# Patient Record
Sex: MALE | Race: WHITE | NOT HISPANIC OR LATINO
[De-identification: names, ages, dates, MRNs, and addresses within clinical notes are randomized per-mention and may not be internally consistent; named-entity substitution may affect disease eponyms.]

---

## 2017-05-19 ENCOUNTER — APPOINTMENT (OUTPATIENT)
Dept: UROLOGY | Facility: CLINIC | Age: 77
End: 2017-05-19

## 2017-05-23 LAB — BACTERIA UR CULT: NORMAL

## 2017-06-02 ENCOUNTER — APPOINTMENT (OUTPATIENT)
Dept: UROLOGY | Facility: CLINIC | Age: 77
End: 2017-06-02

## 2017-06-02 ENCOUNTER — OUTPATIENT (OUTPATIENT)
Dept: OUTPATIENT SERVICES | Facility: HOSPITAL | Age: 77
LOS: 1 days | End: 2017-06-02
Payer: MEDICARE

## 2017-06-02 VITALS
RESPIRATION RATE: 16 BRPM | HEART RATE: 63 BPM | DIASTOLIC BLOOD PRESSURE: 82 MMHG | SYSTOLIC BLOOD PRESSURE: 179 MMHG | TEMPERATURE: 98 F

## 2017-06-02 PROCEDURE — 52000 CYSTOURETHROSCOPY: CPT

## 2017-06-06 DIAGNOSIS — R31.0 GROSS HEMATURIA: ICD-10-CM

## 2017-12-08 ENCOUNTER — APPOINTMENT (OUTPATIENT)
Dept: UROLOGY | Facility: CLINIC | Age: 77
End: 2017-12-08
Payer: MEDICARE

## 2017-12-08 PROCEDURE — 99213 OFFICE O/P EST LOW 20 MIN: CPT

## 2017-12-11 LAB — BACTERIA UR CULT: ABNORMAL

## 2018-06-08 ENCOUNTER — APPOINTMENT (OUTPATIENT)
Dept: UROLOGY | Facility: CLINIC | Age: 78
End: 2018-06-08
Payer: MEDICARE

## 2018-06-08 PROCEDURE — 99212 OFFICE O/P EST SF 10 MIN: CPT

## 2018-06-11 LAB — BACTERIA UR CULT: ABNORMAL

## 2018-06-11 RX ORDER — CIPROFLOXACIN HYDROCHLORIDE 500 MG/1
500 TABLET, FILM COATED ORAL TWICE DAILY
Qty: 14 | Refills: 0 | Status: ACTIVE | COMMUNITY
Start: 2018-06-11 | End: 1900-01-01

## 2019-06-07 ENCOUNTER — APPOINTMENT (OUTPATIENT)
Dept: UROLOGY | Facility: CLINIC | Age: 79
End: 2019-06-07
Payer: MEDICARE

## 2019-06-07 PROCEDURE — 99212 OFFICE O/P EST SF 10 MIN: CPT

## 2019-06-07 NOTE — HISTORY OF PRESENT ILLNESS
[FreeTextEntry1] : Patient  following up for retention.  He has a h/o LUTs and underwent a office-based thermotherapy ~ 4 years ago - he had a Marshall in/out a few times and was then converted to CIC which he has been doing since, 3-4 times a day and once a night. he does have urges which singles the need for a catheter. He had the Marshall replaced in [place] when hospitalized.  A Urodynamics noted high capacity fairly atonic bladder. He does CIC every 4 hours and 0-1 at night. He has noted occasional blood after CIC - NO UTIs this summer..\par Nothing new - about to go to [facility] for the Summer. \par

## 2019-06-10 LAB — BACTERIA UR CULT: NORMAL

## 2019-09-17 ENCOUNTER — APPOINTMENT (OUTPATIENT)
Dept: ORTHOPEDIC SURGERY | Facility: CLINIC | Age: 79
End: 2019-09-17
Payer: MEDICARE

## 2019-09-17 DIAGNOSIS — M17.0 BILATERAL PRIMARY OSTEOARTHRITIS OF KNEE: ICD-10-CM

## 2019-09-17 PROCEDURE — 20611 DRAIN/INJ JOINT/BURSA W/US: CPT | Mod: 58,RT

## 2019-09-17 PROCEDURE — 73562 X-RAY EXAM OF KNEE 3: CPT | Mod: 50

## 2019-09-17 PROCEDURE — 99204 OFFICE O/P NEW MOD 45 MIN: CPT | Mod: 25

## 2019-09-17 RX ORDER — FUROSEMIDE 20 MG/1
20 TABLET ORAL
Qty: 12 | Refills: 0 | Status: ACTIVE | COMMUNITY
Start: 2019-06-03

## 2019-09-17 RX ORDER — AMLODIPINE BESYLATE 5 MG/1
5 TABLET ORAL
Qty: 90 | Refills: 0 | Status: ACTIVE | COMMUNITY
Start: 2019-06-03

## 2019-09-17 RX ORDER — DORZOLAMIDE HYDROCHLORIDE TIMOLOL MALEATE 20; 5 MG/ML; MG/ML
22.3-6.8 SOLUTION/ DROPS OPHTHALMIC
Qty: 10 | Refills: 0 | Status: ACTIVE | COMMUNITY
Start: 2019-01-02

## 2019-09-17 RX ORDER — ACETAMINOPHEN AND CODEINE PHOSPHATE 300; 30 MG/1; MG/1
300-30 TABLET ORAL
Qty: 40 | Refills: 0 | Status: ACTIVE | COMMUNITY
Start: 2019-09-17 | End: 1900-01-01

## 2019-09-17 NOTE — HISTORY OF PRESENT ILLNESS
[de-identified] : C/O AMA KNEE PAIN - LAST HAD GEL INJECTIONS DONE MAY 2018 IN BOTH KNEES \par \par SENT FOR NEW XRAYS TODAYPatient states that he had a fairly miserable summer this year because of increasing bilateral pain right greater than left. Pain is worse with stair climbing.

## 2019-09-17 NOTE — DISCUSSION/SUMMARY
[Surgical risks reviewed] : Surgical risks reviewed [de-identified] : Regional risks and benefits of knee replacement surgery discussed in detail patient asked appropriate questions as did his wife. Their questions were answered she was in her satisfaction. We'll proceed with knee replacement surgery in 30 days time pending medical clearance.

## 2019-09-17 NOTE — PROCEDURE
[de-identified] : Patient was given a cortisone injection into the lateral compartment the right knee. This is done under sterile conditions an ultrasound guidance patient tolerated the procedure well.

## 2019-09-17 NOTE — PHYSICAL EXAM
[de-identified] : Right knee exam today patient has definite medial joint line tenderness range of motion 0-110°. Knee is stable to AP stress as well as varus and valgus stress. No effusion noted there is crepitus with range of motion. [de-identified] : AP standing individual laterals as well as sunrise views were obtained showing complete obliteration of the medial joint space on standing AP projection of both knees.

## 2019-09-24 ENCOUNTER — APPOINTMENT (OUTPATIENT)
Dept: ORTHOPEDIC SURGERY | Facility: CLINIC | Age: 79
End: 2019-09-24

## 2019-10-17 RX ORDER — INFLUENZA VIRUS VACCINE 15; 15; 15; 15 UG/.5ML; UG/.5ML; UG/.5ML; UG/.5ML
0.5 SUSPENSION INTRAMUSCULAR ONCE
Refills: 0 | Status: DISCONTINUED | OUTPATIENT
Start: 2019-10-18 | End: 2019-10-20

## 2019-10-17 RX ORDER — CHLORHEXIDINE GLUCONATE 213 G/1000ML
1 SOLUTION TOPICAL ONCE
Refills: 0 | Status: DISCONTINUED | OUTPATIENT
Start: 2019-10-18 | End: 2019-10-20

## 2019-10-17 RX ORDER — POVIDONE-IODINE 5 %
1 AEROSOL (ML) TOPICAL ONCE
Refills: 0 | Status: DISCONTINUED | OUTPATIENT
Start: 2019-10-18 | End: 2019-10-20

## 2019-10-18 ENCOUNTER — INPATIENT (INPATIENT)
Facility: HOSPITAL | Age: 79
LOS: 1 days | Discharge: HOME HEALTH | DRG: 470 | End: 2019-10-20
Attending: SPECIALIST | Admitting: SPECIALIST
Payer: MEDICARE

## 2019-10-18 ENCOUNTER — APPOINTMENT (OUTPATIENT)
Dept: ORTHOPEDIC SURGERY | Facility: HOSPITAL | Age: 79
End: 2019-10-18
Payer: MEDICARE

## 2019-10-18 VITALS
TEMPERATURE: 98 F | OXYGEN SATURATION: 98 % | DIASTOLIC BLOOD PRESSURE: 82 MMHG | SYSTOLIC BLOOD PRESSURE: 143 MMHG | RESPIRATION RATE: 16 BRPM | HEART RATE: 63 BPM

## 2019-10-18 DIAGNOSIS — E78.5 HYPERLIPIDEMIA, UNSPECIFIED: ICD-10-CM

## 2019-10-18 DIAGNOSIS — Z98.890 OTHER SPECIFIED POSTPROCEDURAL STATES: Chronic | ICD-10-CM

## 2019-10-18 DIAGNOSIS — N31.9 NEUROMUSCULAR DYSFUNCTION OF BLADDER, UNSPECIFIED: ICD-10-CM

## 2019-10-18 DIAGNOSIS — M17.11 UNILATERAL PRIMARY OSTEOARTHRITIS, RIGHT KNEE: ICD-10-CM

## 2019-10-18 DIAGNOSIS — I10 ESSENTIAL (PRIMARY) HYPERTENSION: ICD-10-CM

## 2019-10-18 LAB
APPEARANCE UR: ABNORMAL
BACTERIA # UR AUTO: PRESENT /HPF
BILIRUB UR-MCNC: NEGATIVE — SIGNIFICANT CHANGE UP
COLOR SPEC: YELLOW — SIGNIFICANT CHANGE UP
DIFF PNL FLD: ABNORMAL
EPI CELLS # UR: SIGNIFICANT CHANGE UP /HPF (ref 0–5)
GLUCOSE UR QL: NEGATIVE — SIGNIFICANT CHANGE UP
KETONES UR-MCNC: NEGATIVE — SIGNIFICANT CHANGE UP
LEUKOCYTE ESTERASE UR-ACNC: ABNORMAL
MRSA PCR RESULT.: NEGATIVE — SIGNIFICANT CHANGE UP
NITRITE UR-MCNC: NEGATIVE — SIGNIFICANT CHANGE UP
PH UR: 6.5 — SIGNIFICANT CHANGE UP (ref 5–8)
PROT UR-MCNC: 100 MG/DL
RBC CASTS # UR COMP ASSIST: ABNORMAL /HPF
S AUREUS DNA NOSE QL NAA+PROBE: NEGATIVE — SIGNIFICANT CHANGE UP
SP GR SPEC: 1.02 — SIGNIFICANT CHANGE UP (ref 1–1.03)
UROBILINOGEN FLD QL: 0.2 E.U./DL — SIGNIFICANT CHANGE UP
WBC UR QL: SIGNIFICANT CHANGE UP /HPF

## 2019-10-18 PROCEDURE — 27447 TOTAL KNEE ARTHROPLASTY: CPT | Mod: RT

## 2019-10-18 PROCEDURE — 73560 X-RAY EXAM OF KNEE 1 OR 2: CPT | Mod: 26,RT

## 2019-10-18 RX ORDER — SODIUM CHLORIDE 9 MG/ML
1000 INJECTION, SOLUTION INTRAVENOUS
Refills: 0 | Status: DISCONTINUED | OUTPATIENT
Start: 2019-10-18 | End: 2019-10-20

## 2019-10-18 RX ORDER — ASPIRIN/CALCIUM CARB/MAGNESIUM 324 MG
325 TABLET ORAL
Refills: 0 | Status: DISCONTINUED | OUTPATIENT
Start: 2019-10-18 | End: 2019-10-20

## 2019-10-18 RX ORDER — ONDANSETRON 8 MG/1
4 TABLET, FILM COATED ORAL EVERY 6 HOURS
Refills: 0 | Status: DISCONTINUED | OUTPATIENT
Start: 2019-10-18 | End: 2019-10-20

## 2019-10-18 RX ORDER — ACETAMINOPHEN 500 MG
650 TABLET ORAL EVERY 6 HOURS
Refills: 0 | Status: DISCONTINUED | OUTPATIENT
Start: 2019-10-18 | End: 2019-10-20

## 2019-10-18 RX ORDER — POLYETHYLENE GLYCOL 3350 17 G/17G
17 POWDER, FOR SOLUTION ORAL DAILY
Refills: 0 | Status: DISCONTINUED | OUTPATIENT
Start: 2019-10-18 | End: 2019-10-20

## 2019-10-18 RX ORDER — CEFAZOLIN SODIUM 1 G
2000 VIAL (EA) INJECTION EVERY 8 HOURS
Refills: 0 | Status: COMPLETED | OUTPATIENT
Start: 2019-10-18 | End: 2019-10-18

## 2019-10-18 RX ORDER — MAGNESIUM HYDROXIDE 400 MG/1
30 TABLET, CHEWABLE ORAL DAILY
Refills: 0 | Status: DISCONTINUED | OUTPATIENT
Start: 2019-10-18 | End: 2019-10-20

## 2019-10-18 RX ORDER — ACETAMINOPHEN 500 MG
1000 TABLET ORAL ONCE
Refills: 0 | Status: COMPLETED | OUTPATIENT
Start: 2019-10-18 | End: 2019-10-18

## 2019-10-18 RX ORDER — ATORVASTATIN CALCIUM 80 MG/1
20 TABLET, FILM COATED ORAL AT BEDTIME
Refills: 0 | Status: DISCONTINUED | OUTPATIENT
Start: 2019-10-18 | End: 2019-10-20

## 2019-10-18 RX ORDER — PANTOPRAZOLE SODIUM 20 MG/1
40 TABLET, DELAYED RELEASE ORAL
Refills: 0 | Status: DISCONTINUED | OUTPATIENT
Start: 2019-10-18 | End: 2019-10-20

## 2019-10-18 RX ORDER — OXYCODONE HYDROCHLORIDE 5 MG/1
5 TABLET ORAL EVERY 4 HOURS
Refills: 0 | Status: DISCONTINUED | OUTPATIENT
Start: 2019-10-18 | End: 2019-10-20

## 2019-10-18 RX ORDER — OXYCODONE HYDROCHLORIDE 5 MG/1
10 TABLET ORAL EVERY 4 HOURS
Refills: 0 | Status: DISCONTINUED | OUTPATIENT
Start: 2019-10-18 | End: 2019-10-20

## 2019-10-18 RX ORDER — MORPHINE SULFATE 50 MG/1
2 CAPSULE, EXTENDED RELEASE ORAL EVERY 4 HOURS
Refills: 0 | Status: DISCONTINUED | OUTPATIENT
Start: 2019-10-18 | End: 2019-10-20

## 2019-10-18 RX ORDER — KETOROLAC TROMETHAMINE 30 MG/ML
15 SYRINGE (ML) INJECTION EVERY 6 HOURS
Refills: 0 | Status: DISCONTINUED | OUTPATIENT
Start: 2019-10-18 | End: 2019-10-19

## 2019-10-18 RX ORDER — SENNA PLUS 8.6 MG/1
2 TABLET ORAL AT BEDTIME
Refills: 0 | Status: DISCONTINUED | OUTPATIENT
Start: 2019-10-18 | End: 2019-10-20

## 2019-10-18 RX ORDER — DOCUSATE SODIUM 100 MG
100 CAPSULE ORAL THREE TIMES A DAY
Refills: 0 | Status: DISCONTINUED | OUTPATIENT
Start: 2019-10-18 | End: 2019-10-20

## 2019-10-18 RX ORDER — APREPITANT 80 MG/1
40 CAPSULE ORAL ONCE
Refills: 0 | Status: COMPLETED | OUTPATIENT
Start: 2019-10-18 | End: 2019-10-18

## 2019-10-18 RX ORDER — CEFTRIAXONE 500 MG/1
1000 INJECTION, POWDER, FOR SOLUTION INTRAMUSCULAR; INTRAVENOUS EVERY 24 HOURS
Refills: 0 | Status: DISCONTINUED | OUTPATIENT
Start: 2019-10-18 | End: 2019-10-18

## 2019-10-18 RX ADMIN — Medication 15 MILLIGRAM(S): at 23:17

## 2019-10-18 RX ADMIN — OXYCODONE HYDROCHLORIDE 5 MILLIGRAM(S): 5 TABLET ORAL at 16:55

## 2019-10-18 RX ADMIN — Medication 100 MILLIGRAM(S): at 16:08

## 2019-10-18 RX ADMIN — OXYCODONE HYDROCHLORIDE 5 MILLIGRAM(S): 5 TABLET ORAL at 16:15

## 2019-10-18 RX ADMIN — Medication 650 MILLIGRAM(S): at 17:28

## 2019-10-18 RX ADMIN — APREPITANT 40 MILLIGRAM(S): 80 CAPSULE ORAL at 07:24

## 2019-10-18 RX ADMIN — Medication 15 MILLIGRAM(S): at 17:48

## 2019-10-18 RX ADMIN — Medication 650 MILLIGRAM(S): at 18:03

## 2019-10-18 RX ADMIN — Medication 650 MILLIGRAM(S): at 23:17

## 2019-10-18 RX ADMIN — ATORVASTATIN CALCIUM 20 MILLIGRAM(S): 80 TABLET, FILM COATED ORAL at 21:09

## 2019-10-18 RX ADMIN — Medication 100 MILLIGRAM(S): at 21:09

## 2019-10-18 RX ADMIN — Medication 100 MILLIGRAM(S): at 23:18

## 2019-10-18 RX ADMIN — Medication 325 MILLIGRAM(S): at 17:28

## 2019-10-18 RX ADMIN — Medication 1000 MILLIGRAM(S): at 07:26

## 2019-10-18 RX ADMIN — Medication 15 MILLIGRAM(S): at 17:28

## 2019-10-18 RX ADMIN — Medication 1000 MILLIGRAM(S): at 07:24

## 2019-10-18 NOTE — BRIEF OPERATIVE NOTE - NSICDXBRIEFPOSTOP_GEN_ALL_CORE_FT
POST-OP DIAGNOSIS:  Primary osteoarthritis of right knee 18-Oct-2019 10:50:32 Primary osteoarthritis of right knee Jann Finney

## 2019-10-18 NOTE — H&P ADULT - NSICDXPASTMEDICALHX_GEN_ALL_CORE_FT
PAST MEDICAL HISTORY:  Epididymitis     HLD (hyperlipidemia)     HTN (hypertension)     Neurogenic bladder pt self cathaterize every 6 hours

## 2019-10-18 NOTE — H&P ADULT - NSHPLABSRESULTS_GEN_ALL_CORE
Preop CBC, PT/INR, PTT within normal range  BMP reveals Cr. !.37   UA + leukesterase- patient self catheterizes- repeat UA DOS   3M DOS   Preop CXR within normal limits, reviewed per medical clearance   Preop EKG NSR and reviewed per medical clearance

## 2019-10-18 NOTE — PHYSICAL THERAPY INITIAL EVALUATION ADULT - ADDITIONAL COMMENTS
Patient lives in house, 1 flight to enter. Patient denies home health assistance, DME, or history of falls.

## 2019-10-18 NOTE — H&P ADULT - PROBLEM SELECTOR PLAN 1
Admit to Orthopedic Service   For elective Right Total Knee Replacement   Medically cleared and optimized for surgery by Dr. Bustos

## 2019-10-18 NOTE — H&P ADULT - HISTORY OF PRESENT ILLNESS
Patient is a 78 y/o male who presents with c/o right knee pain present for several years. Patient notes pain and stiffness at the right knee that started gradually and has progressed overtime. Patient reports failure of conservative management including intraarticular steroid injections, activity modification, pain control with oral analgesics. He denies associated numbness, tingling through the right lower extremity, denies use of a cane or walker for ambulation. Following thorough review of the risks and benefits of the procedure, patient elects to undergo right total knee replacement with Dr. Ndiaye today 10-18-19.   He denies recent illness, denies fever, chills, use of antibiotics, denies known h/o blood clots or use of anticoagulants  He endorses straight catheterization for neurogenic bladder.

## 2019-10-18 NOTE — BRIEF OPERATIVE NOTE - NSICDXBRIEFPREOP_GEN_ALL_CORE_FT
PRE-OP DIAGNOSIS:  Primary osteoarthritis of right knee 18-Oct-2019 10:50:26 Primary osteoarthritis of right knee Jann Finney

## 2019-10-18 NOTE — PHYSICAL THERAPY INITIAL EVALUATION ADULT - ACTIVE RANGE OF MOTION EXAMINATION, REHAB EVAL
deficits as listed below/bilateral upper extremity Active ROM was WFL (within functional limits)/except R knee flexion/extension/bilateral  lower extremity Active ROM was WFL (within functional limits)

## 2019-10-18 NOTE — PHYSICAL THERAPY INITIAL EVALUATION ADULT - PERTINENT HX OF CURRENT PROBLEM, REHAB EVAL
Patient is a 80 y/o male who presents with c/o right knee pain present for several years. Patient notes pain and stiffness at the right knee that started gradually and has progressed overtime. Patient reports failure of conservative management including intraarticular steroid injections, activity modification, pain control with oral analgesics.

## 2019-10-18 NOTE — PROGRESS NOTE ADULT - SUBJECTIVE AND OBJECTIVE BOX
Ortho Post Op Check    Procedure:  R TKA	  Surgeon:  Dr. Ndiaye    Pt comfortable without complaints, pain controlled.  Denies CP, SOB, N/V, numbness/tingling down le b/l.    Vital Signs Last 24 Hrs  T(C): 36.4 (10-18-19 @ 12:39), Max: 36.4 (10-18-19 @ 12:39)  T(F): 97.5 (10-18-19 @ 12:39), Max: 97.5 (10-18-19 @ 12:39)  HR: 70 (10-18-19 @ 12:39) (52 - 79)  BP: 127/79 (10-18-19 @ 12:39) (103/71 - 127/79)  BP(mean): 97 (10-18-19 @ 11:50) (95 - 97)  RR: 16 (10-18-19 @ 12:39) (15 - 27)  SpO2: 99% (10-18-19 @ 12:39) (97% - 99%)      General: Pt Alert and oriented, NAD  DSG ace wrap R knee C/D/I Cryocuff in place  Pulses:  DP's palpable b/l, brisk cap refill b/l  Sensation:  SILT throughout le b/l and symmetrically   Motor: EHL/FHL/TA/GS 5/5 b/l            Post-op X-Ray:  components in place, well positioned.    A/P: 79yMale POD#0 s/p R TKA  - Stable  - Pain Control as needed  - DVT ppx:  asa  - Post op abx:  ancef  - PT, WBS:  WBAT R knee  - Continue ceftriaxone for + UA  - Trend labs  - Dispo: pending PT eval    Ortho Pager [phone] Ortho Post Op Check    Procedure:  R TKA	  Surgeon:  Dr. Ndiaye    Pt comfortable without complaints, pain controlled.  Denies CP, SOB, N/V, numbness/tingling down le b/l.    Vital Signs Last 24 Hrs  T(C): 36.4 (10-18-19 @ 12:39), Max: 36.4 (10-18-19 @ 12:39)  T(F): 97.5 (10-18-19 @ 12:39), Max: 97.5 (10-18-19 @ 12:39)  HR: 70 (10-18-19 @ 12:39) (52 - 79)  BP: 127/79 (10-18-19 @ 12:39) (103/71 - 127/79)  BP(mean): 97 (10-18-19 @ 11:50) (95 - 97)  RR: 16 (10-18-19 @ 12:39) (15 - 27)  SpO2: 99% (10-18-19 @ 12:39) (97% - 99%)      General: Pt Alert and oriented, NAD  DSG ace wrap R knee C/D/I Cryocuff in place  Pulses:  DP's palpable b/l, brisk cap refill b/l  Sensation:  SILT throughout le b/l and symmetrically   Motor: EHL/FHL/TA/GS 5/5 b/l            Post-op X-Ray:  components in place, well positioned.    A/P: 79yMale POD#0 s/p R TKA  - Stable  - Pain Control as needed  - DVT ppx:  asa  - Post op abx:  ancef  - PT, WBS:  WBAT R knee  - As per pharmacy may continue 24hrs of ancef antibiotics for postop antibiotic therapy as well as UA  - Trend labs  - Dispo: pending PT eval    Ortho Pager [phone]

## 2019-10-18 NOTE — H&P ADULT - PROBLEM SELECTOR PLAN 2
patient self catheterizes at home  straight cath vs [place] during IP stay  follow repeat UA results- will treat as needed

## 2019-10-18 NOTE — H&P ADULT - NSHPPHYSICALEXAM_GEN_ALL_CORE
Gen:  80 y/o male, well nourished, well developed, NAD  MSK: Decreased ROM secondary to pain at the right knee   calves soft, nontender bilaterally   sensation intact to light touch bilateral lower extremities  DP 2+,  brisk capillary refill  EHL/FHL/TA/GS 5/5 bilaterally     Rest of PE per MD clearance

## 2019-10-19 LAB
ANION GAP SERPL CALC-SCNC: 12 MMOL/L — SIGNIFICANT CHANGE UP (ref 5–17)
BUN SERPL-MCNC: 30 MG/DL — HIGH (ref 7–23)
CALCIUM SERPL-MCNC: 8.2 MG/DL — LOW (ref 8.4–10.5)
CHLORIDE SERPL-SCNC: 105 MMOL/L — SIGNIFICANT CHANGE UP (ref 96–108)
CO2 SERPL-SCNC: 24 MMOL/L — SIGNIFICANT CHANGE UP (ref 22–31)
CREAT SERPL-MCNC: 1.45 MG/DL — HIGH (ref 0.5–1.3)
GLUCOSE SERPL-MCNC: 137 MG/DL — HIGH (ref 70–99)
HCT VFR BLD CALC: 44.7 % — SIGNIFICANT CHANGE UP (ref 39–50)
HGB BLD-MCNC: 14.6 G/DL — SIGNIFICANT CHANGE UP (ref 13–17)
MCHC RBC-ENTMCNC: 29.7 PG — SIGNIFICANT CHANGE UP (ref 27–34)
MCHC RBC-ENTMCNC: 32.7 GM/DL — SIGNIFICANT CHANGE UP (ref 32–36)
MCV RBC AUTO: 90.9 FL — SIGNIFICANT CHANGE UP (ref 80–100)
NRBC # BLD: 0 /100 WBCS — SIGNIFICANT CHANGE UP (ref 0–0)
PLATELET # BLD AUTO: 220 K/UL — SIGNIFICANT CHANGE UP (ref 150–400)
POTASSIUM SERPL-MCNC: 4 MMOL/L — SIGNIFICANT CHANGE UP (ref 3.5–5.3)
POTASSIUM SERPL-SCNC: 4 MMOL/L — SIGNIFICANT CHANGE UP (ref 3.5–5.3)
RBC # BLD: 4.92 M/UL — SIGNIFICANT CHANGE UP (ref 4.2–5.8)
RBC # FLD: 12.3 % — SIGNIFICANT CHANGE UP (ref 10.3–14.5)
SODIUM SERPL-SCNC: 141 MMOL/L — SIGNIFICANT CHANGE UP (ref 135–145)
WBC # BLD: 18.78 K/UL — HIGH (ref 3.8–10.5)
WBC # FLD AUTO: 18.78 K/UL — HIGH (ref 3.8–10.5)

## 2019-10-19 RX ADMIN — Medication 15 MILLIGRAM(S): at 12:48

## 2019-10-19 RX ADMIN — Medication 15 MILLIGRAM(S): at 05:37

## 2019-10-19 RX ADMIN — PANTOPRAZOLE SODIUM 40 MILLIGRAM(S): 20 TABLET, DELAYED RELEASE ORAL at 05:37

## 2019-10-19 RX ADMIN — Medication 15 MILLIGRAM(S): at 13:10

## 2019-10-19 RX ADMIN — ATORVASTATIN CALCIUM 20 MILLIGRAM(S): 80 TABLET, FILM COATED ORAL at 22:14

## 2019-10-19 RX ADMIN — Medication 650 MILLIGRAM(S): at 05:37

## 2019-10-19 RX ADMIN — Medication 15 MILLIGRAM(S): at 00:17

## 2019-10-19 RX ADMIN — Medication 325 MILLIGRAM(S): at 17:12

## 2019-10-19 RX ADMIN — Medication 650 MILLIGRAM(S): at 13:52

## 2019-10-19 RX ADMIN — Medication 15 MILLIGRAM(S): at 18:15

## 2019-10-19 RX ADMIN — Medication 100 MILLIGRAM(S): at 22:14

## 2019-10-19 RX ADMIN — Medication 100 MILLIGRAM(S): at 12:49

## 2019-10-19 RX ADMIN — Medication 650 MILLIGRAM(S): at 06:37

## 2019-10-19 RX ADMIN — POLYETHYLENE GLYCOL 3350 17 GRAM(S): 17 POWDER, FOR SOLUTION ORAL at 12:48

## 2019-10-19 RX ADMIN — Medication 650 MILLIGRAM(S): at 12:48

## 2019-10-19 RX ADMIN — Medication 325 MILLIGRAM(S): at 05:38

## 2019-10-19 RX ADMIN — Medication 15 MILLIGRAM(S): at 18:00

## 2019-10-19 RX ADMIN — Medication 650 MILLIGRAM(S): at 18:37

## 2019-10-19 RX ADMIN — Medication 100 MILLIGRAM(S): at 05:37

## 2019-10-19 RX ADMIN — Medication 650 MILLIGRAM(S): at 18:00

## 2019-10-19 RX ADMIN — Medication 15 MILLIGRAM(S): at 06:37

## 2019-10-19 RX ADMIN — Medication 650 MILLIGRAM(S): at 00:17

## 2019-10-19 NOTE — PROGRESS NOTE ADULT - SUBJECTIVE AND OBJECTIVE BOX
S: Patient seen and examined. Pt. doing well, Pain endorsed but controlled. No acute events overnight.    Vital Signs Last 24 Hrs  T(C): 36.4 (19 Oct 2019 04:57), Max: 37 (18 Oct 2019 16:29)  T(F): 97.5 (19 Oct 2019 04:57), Max: 98.6 (18 Oct 2019 16:29)  HR: 60 (19 Oct 2019 04:57) (52 - 81)  BP: 135/49 (19 Oct 2019 04:57) (103/71 - 150/92)  BP(mean): 97 (18 Oct 2019 11:50) (95 - 97)  RR: 13 (19 Oct 2019 04:57) (13 - 27)  SpO2: 96% (19 Oct 2019 04:57) (95% - 99%)    NAD, AOx3, comfortable  Motor: 5/5 GS/TA/EHL/FHL    Sensation: SILT   Pulses: WWP, DP/PT 2+    Dressing: C/D/I                          14.6   18.78 )-----------( 220      ( 19 Oct 2019 06:23 )             44.7         A/P:  79y Male s/p R TKA 10/18          -Stable  -Pain Control  -PT/WBAT  -DVT ppx: ASA  -Advance diet as tolerated  -f/u AM labs  -Dispo: H v R

## 2019-10-20 ENCOUNTER — TRANSCRIPTION ENCOUNTER (OUTPATIENT)
Age: 79
End: 2019-10-20

## 2019-10-20 VITALS
SYSTOLIC BLOOD PRESSURE: 140 MMHG | OXYGEN SATURATION: 95 % | DIASTOLIC BLOOD PRESSURE: 86 MMHG | TEMPERATURE: 98 F | RESPIRATION RATE: 17 BRPM | HEART RATE: 74 BPM

## 2019-10-20 LAB
ANION GAP SERPL CALC-SCNC: 10 MMOL/L — SIGNIFICANT CHANGE UP (ref 5–17)
BUN SERPL-MCNC: 30 MG/DL — HIGH (ref 7–23)
CALCIUM SERPL-MCNC: 8.4 MG/DL — SIGNIFICANT CHANGE UP (ref 8.4–10.5)
CHLORIDE SERPL-SCNC: 106 MMOL/L — SIGNIFICANT CHANGE UP (ref 96–108)
CO2 SERPL-SCNC: 26 MMOL/L — SIGNIFICANT CHANGE UP (ref 22–31)
CREAT SERPL-MCNC: 1.31 MG/DL — HIGH (ref 0.5–1.3)
GLUCOSE SERPL-MCNC: 105 MG/DL — HIGH (ref 70–99)
HCT VFR BLD CALC: 42.8 % — SIGNIFICANT CHANGE UP (ref 39–50)
HGB BLD-MCNC: 14.2 G/DL — SIGNIFICANT CHANGE UP (ref 13–17)
MCHC RBC-ENTMCNC: 30 PG — SIGNIFICANT CHANGE UP (ref 27–34)
MCHC RBC-ENTMCNC: 33.2 GM/DL — SIGNIFICANT CHANGE UP (ref 32–36)
MCV RBC AUTO: 90.3 FL — SIGNIFICANT CHANGE UP (ref 80–100)
NRBC # BLD: 0 /100 WBCS — SIGNIFICANT CHANGE UP (ref 0–0)
PLATELET # BLD AUTO: 180 K/UL — SIGNIFICANT CHANGE UP (ref 150–400)
POTASSIUM SERPL-MCNC: 4.2 MMOL/L — SIGNIFICANT CHANGE UP (ref 3.5–5.3)
POTASSIUM SERPL-SCNC: 4.2 MMOL/L — SIGNIFICANT CHANGE UP (ref 3.5–5.3)
RBC # BLD: 4.74 M/UL — SIGNIFICANT CHANGE UP (ref 4.2–5.8)
RBC # FLD: 13.1 % — SIGNIFICANT CHANGE UP (ref 10.3–14.5)
SODIUM SERPL-SCNC: 142 MMOL/L — SIGNIFICANT CHANGE UP (ref 135–145)
WBC # BLD: 13.46 K/UL — HIGH (ref 3.8–10.5)
WBC # FLD AUTO: 13.46 K/UL — HIGH (ref 3.8–10.5)

## 2019-10-20 RX ORDER — OXYCODONE HYDROCHLORIDE 5 MG/1
1 TABLET ORAL
Qty: 15 | Refills: 0
Start: 2019-10-20

## 2019-10-20 RX ORDER — DIPHENHYDRAMINE HCL 50 MG
25 CAPSULE ORAL EVERY 6 HOURS
Refills: 0 | Status: DISCONTINUED | OUTPATIENT
Start: 2019-10-20 | End: 2019-10-20

## 2019-10-20 RX ORDER — ASPIRIN/CALCIUM CARB/MAGNESIUM 324 MG
1 TABLET ORAL
Qty: 60 | Refills: 0
Start: 2019-10-20 | End: 2019-11-18

## 2019-10-20 RX ADMIN — PANTOPRAZOLE SODIUM 40 MILLIGRAM(S): 20 TABLET, DELAYED RELEASE ORAL at 05:20

## 2019-10-20 RX ADMIN — Medication 650 MILLIGRAM(S): at 05:21

## 2019-10-20 RX ADMIN — Medication 100 MILLIGRAM(S): at 05:21

## 2019-10-20 RX ADMIN — Medication 650 MILLIGRAM(S): at 06:00

## 2019-10-20 RX ADMIN — Medication 325 MILLIGRAM(S): at 05:20

## 2019-10-20 NOTE — DISCHARGE NOTE PROVIDER - CARE PROVIDERS DIRECT ADDRESSES
[email or web address]

## 2019-10-20 NOTE — DISCHARGE NOTE PROVIDER - NSDCCPCAREPLAN_GEN_ALL_CORE_FT
PRINCIPAL DISCHARGE DIAGNOSIS  Diagnosis: Primary osteoarthritis of right knee  Assessment and Plan of Treatment: Primary osteoarthritis of right knee

## 2019-10-20 NOTE — DISCHARGE NOTE PROVIDER - HOSPITAL COURSE
Admitted    Surgery R TKA 10/18    Iona-op Antibiotics    Pain control    DVT prophylaxis    OOB/Physical Therapy

## 2019-10-20 NOTE — PROGRESS NOTE ADULT - SUBJECTIVE AND OBJECTIVE BOX
S: Patient seen and examined. Pt. doing well, Pain endorsed but controlled. No acute events overnight.    Vital Signs Last 24 Hrs  T(C): 36.6 (20 Oct 2019 08:10), Max: 36.8 (19 Oct 2019 16:00)  T(F): 97.9 (20 Oct 2019 08:10), Max: 98.3 (19 Oct 2019 16:00)  HR: 74 (20 Oct 2019 08:10) (67 - 74)  BP: 140/86 (20 Oct 2019 08:10) (125/80 - 140/86)  BP(mean): --  RR: 17 (20 Oct 2019 08:10) (16 - 17)  SpO2: 95% (20 Oct 2019 08:10) (95% - 98%)    NAD, AOx3, comfortable  Motor: 5/5 GS/TA/EHL/FHL    Sensation: SILT   Pulses: WWP, DP/PT 2+    Dressing: C/D/I                               14.2   13.46 )-----------( 180      ( 20 Oct 2019 06:34 )             42.8           A/P:  79y Male s/p R TKA 10/18          -Stable  -Pain Control  -PT/WBAT  -DVT ppx: ASA  -Advance diet as tolerated  -f/u AM labs  -Dispo: H v R

## 2019-10-20 NOTE — DISCHARGE NOTE NURSING/CASE MANAGEMENT/SOCIAL WORK - PATIENT PORTAL LINK FT
You can access the FollowMyHealth Patient Portal offered by  by registering at the following website: [email or web address]. By joining [facility]’s FollowMyHealth portal, you will also be able to view your health information using other applications (apps) compatible with our system.

## 2019-10-20 NOTE — DISCHARGE NOTE PROVIDER - CARE PROVIDER_API CALL
Leighton Ndiaye)  Orthopaedic Surgery  [address], 10th [place] [address]  Phone: [phone]  Fax: [phone]  Follow Up Time:

## 2019-10-22 ENCOUNTER — RX RENEWAL (OUTPATIENT)
Age: 79
End: 2019-10-22

## 2019-10-22 RX ORDER — CELECOXIB 200 MG/1
200 CAPSULE ORAL TWICE DAILY
Qty: 60 | Refills: 2 | Status: ACTIVE | COMMUNITY
Start: 2019-10-22 | End: 1900-01-01

## 2019-10-24 ENCOUNTER — RX RENEWAL (OUTPATIENT)
Age: 79
End: 2019-10-24

## 2019-10-24 PROBLEM — N45.1 EPIDIDYMITIS: Chronic | Status: ACTIVE | Noted: 2019-10-17

## 2019-10-24 PROBLEM — N31.9 NEUROMUSCULAR DYSFUNCTION OF BLADDER, UNSPECIFIED: Chronic | Status: ACTIVE | Noted: 2019-10-18

## 2019-10-24 PROBLEM — I10 ESSENTIAL (PRIMARY) HYPERTENSION: Chronic | Status: ACTIVE | Noted: 2019-10-17

## 2019-10-24 PROBLEM — E78.5 HYPERLIPIDEMIA, UNSPECIFIED: Chronic | Status: ACTIVE | Noted: 2019-10-17

## 2019-10-24 RX ORDER — MELOXICAM 15 MG/1
15 TABLET ORAL
Qty: 30 | Refills: 2 | Status: ACTIVE | COMMUNITY
Start: 2019-10-24 | End: 1900-01-01

## 2019-10-24 RX ORDER — OXYCODONE AND ACETAMINOPHEN 5; 325 MG/1; MG/1
5-325 TABLET ORAL
Qty: 40 | Refills: 0 | Status: ACTIVE | COMMUNITY
Start: 2019-10-24 | End: 1900-01-01

## 2019-10-25 DIAGNOSIS — G89.29 OTHER CHRONIC PAIN: ICD-10-CM

## 2019-10-25 DIAGNOSIS — I10 ESSENTIAL (PRIMARY) HYPERTENSION: ICD-10-CM

## 2019-10-25 DIAGNOSIS — E78.5 HYPERLIPIDEMIA, UNSPECIFIED: ICD-10-CM

## 2019-10-25 DIAGNOSIS — N31.9 NEUROMUSCULAR DYSFUNCTION OF BLADDER, UNSPECIFIED: ICD-10-CM

## 2019-10-25 DIAGNOSIS — M17.11 UNILATERAL PRIMARY OSTEOARTHRITIS, RIGHT KNEE: ICD-10-CM

## 2019-10-25 DIAGNOSIS — Z98.890 OTHER SPECIFIED POSTPROCEDURAL STATES: ICD-10-CM

## 2019-10-31 ENCOUNTER — APPOINTMENT (OUTPATIENT)
Dept: ORTHOPEDIC SURGERY | Facility: CLINIC | Age: 79
End: 2019-10-31
Payer: MEDICARE

## 2019-10-31 PROCEDURE — 99024 POSTOP FOLLOW-UP VISIT: CPT

## 2019-10-31 NOTE — PHYSICAL EXAM
[de-identified] : Patient's wound is well-healed on the right lower extremity. Range of motion 0-130°. Neurovascular intact distally. [de-identified] : No radiographs done today

## 2019-11-05 ENCOUNTER — APPOINTMENT (OUTPATIENT)
Dept: ORTHOPEDIC SURGERY | Facility: CLINIC | Age: 79
End: 2019-11-05

## 2019-11-12 ENCOUNTER — RX RENEWAL (OUTPATIENT)
Age: 79
End: 2019-11-12

## 2019-11-12 PROCEDURE — 87641 MR-STAPH DNA AMP PROBE: CPT

## 2019-11-12 PROCEDURE — 36415 COLL VENOUS BLD VENIPUNCTURE: CPT

## 2019-11-12 PROCEDURE — 80048 BASIC METABOLIC PNL TOTAL CA: CPT

## 2019-11-12 PROCEDURE — 73560 X-RAY EXAM OF KNEE 1 OR 2: CPT

## 2019-11-12 PROCEDURE — C1776: CPT

## 2019-11-12 PROCEDURE — 97161 PT EVAL LOW COMPLEX 20 MIN: CPT

## 2019-11-12 PROCEDURE — 85027 COMPLETE CBC AUTOMATED: CPT

## 2019-11-12 PROCEDURE — 97116 GAIT TRAINING THERAPY: CPT

## 2019-11-12 PROCEDURE — C1713: CPT

## 2019-11-12 PROCEDURE — 81001 URINALYSIS AUTO W/SCOPE: CPT

## 2019-11-12 RX ORDER — OXYCODONE AND ACETAMINOPHEN 5; 325 MG/1; MG/1
5-325 TABLET ORAL
Qty: 40 | Refills: 0 | Status: ACTIVE | COMMUNITY
Start: 2019-11-12 | End: 1900-01-01

## 2019-11-12 RX ORDER — MELOXICAM 7.5 MG/1
7.5 TABLET ORAL TWICE DAILY
Qty: 60 | Refills: 2 | Status: ACTIVE | COMMUNITY
Start: 2019-11-12 | End: 1900-01-01

## 2019-12-05 ENCOUNTER — APPOINTMENT (OUTPATIENT)
Dept: ORTHOPEDIC SURGERY | Facility: CLINIC | Age: 79
End: 2019-12-05
Payer: MEDICARE

## 2019-12-05 PROCEDURE — 73562 X-RAY EXAM OF KNEE 3: CPT | Mod: RT

## 2019-12-05 PROCEDURE — 99024 POSTOP FOLLOW-UP VISIT: CPT

## 2019-12-05 RX ORDER — OXYCODONE 5 MG/1
5 TABLET ORAL
Qty: 15 | Refills: 0 | Status: ACTIVE | COMMUNITY
Start: 2019-10-20

## 2019-12-05 NOTE — PHYSICAL EXAM
[de-identified] : Right knee range of motion is 3-135°. There is some soft tissue swelling and warmth about the knee. There is no crepitus noted. [de-identified] : AP standing individual lateral and sunrise views are obtained showing excellent component position of a right knee [employer] journey knee.

## 2019-12-05 NOTE — REASON FOR VISIT
[Post Operative Visit] : a post operative visit for [FreeTextEntry2] : POST RIGHT TOTAL KNEE REPLACEMENT 10/18/19

## 2019-12-05 NOTE — HISTORY OF PRESENT ILLNESS
[de-identified] : Patient is here for her second postop visit he is approximately 6 weeks status post right knee replacement. Patient complains of some stiffness intermittently and swelling but overall he is pleased with the results so far.

## 2019-12-05 NOTE — DISCUSSION/SUMMARY
[de-identified] : Patient will continue his physical therapy and home exercise routine followup with me in one month's time for a final check.

## 2020-01-16 ENCOUNTER — APPOINTMENT (OUTPATIENT)
Dept: ORTHOPEDIC SURGERY | Facility: CLINIC | Age: 80
End: 2020-01-16
Payer: MEDICARE

## 2020-01-16 PROCEDURE — 99024 POSTOP FOLLOW-UP VISIT: CPT

## 2020-01-16 RX ORDER — DICLOFENAC SODIUM 10 MG/G
1 GEL TOPICAL DAILY
Qty: 1 | Refills: 3 | Status: ACTIVE | COMMUNITY
Start: 2020-01-16 | End: 1900-01-01

## 2020-01-16 RX ORDER — CELECOXIB 200 MG/1
200 CAPSULE ORAL TWICE DAILY
Qty: 60 | Refills: 2 | Status: ACTIVE | COMMUNITY
Start: 2020-01-16 | End: 1900-01-01

## 2020-01-16 NOTE — PHYSICAL EXAM
[de-identified] : Right knee patient has range of motion of 0-125°. He has some mild warmth and soft tissue and no effusion neurovascular intact he is stable in full extension midrange and 90° flexion.

## 2020-01-16 NOTE — DISCUSSION/SUMMARY
[de-identified] : Patient will continue with his own exercise regimen he'll follow up me in 6 weeks for final check

## 2020-01-16 NOTE — HISTORY OF PRESENT ILLNESS
[de-identified] : Patient is approximately 3 months status post right knee replacement. He is here for followup routine visit. He states he continues to have nighttime pain but overall his situation is improving in place a cane so far pleased with her result.

## 2020-02-20 ENCOUNTER — APPOINTMENT (OUTPATIENT)
Dept: ORTHOPEDIC SURGERY | Facility: CLINIC | Age: 80
End: 2020-02-20
Payer: MEDICARE

## 2020-02-20 PROCEDURE — 99214 OFFICE O/P EST MOD 30 MIN: CPT

## 2020-02-20 RX ORDER — DICLOFENAC SODIUM 10 MG/G
1 GEL TOPICAL DAILY
Qty: 1 | Refills: 3 | Status: ACTIVE | COMMUNITY
Start: 2020-02-20 | End: 1900-01-01

## 2020-02-20 RX ORDER — CELECOXIB 200 MG/1
200 CAPSULE ORAL TWICE DAILY
Qty: 60 | Refills: 2 | Status: ACTIVE | COMMUNITY
Start: 2020-02-20 | End: 1900-01-01

## 2020-02-20 NOTE — HISTORY OF PRESENT ILLNESS
[de-identified] : Patient is doing well he is here with minimal complaints are some discomfort with stair climbing and getting out of a seated position but overall is improving. He status post 4 months new revision surgery.

## 2020-02-20 NOTE — DISCUSSION/SUMMARY
[de-identified] : Patient is doing well he'll follow up me in one year postop visit if patient has symptoms that do not evelyne buy July he will return sooner.

## 2020-02-20 NOTE — PHYSICAL EXAM
[de-identified] : Range of motion is 0-130° today. Is minimal warmth minimal soft tissue swelling.

## 2020-06-05 ENCOUNTER — APPOINTMENT (OUTPATIENT)
Dept: UROLOGY | Facility: CLINIC | Age: 80
End: 2020-06-05

## 2020-07-28 ENCOUNTER — APPOINTMENT (OUTPATIENT)
Dept: ORTHOPEDIC SURGERY | Facility: CLINIC | Age: 80
End: 2020-07-28
Payer: MEDICARE

## 2020-07-28 PROCEDURE — 73562 X-RAY EXAM OF KNEE 3: CPT | Mod: 50

## 2020-07-28 PROCEDURE — 99214 OFFICE O/P EST MOD 30 MIN: CPT

## 2020-07-28 RX ORDER — CELECOXIB 200 MG/1
200 CAPSULE ORAL DAILY
Qty: 30 | Refills: 0 | Status: ACTIVE | COMMUNITY
Start: 2020-07-28 | End: 1900-01-01

## 2020-07-28 NOTE — REASON FOR VISIT
[Follow-Up Visit] : a follow-up visit for [FreeTextEntry2] : LEFT KNEE PAIN - DISCUSS SURGERY / RIGHT KNEE REPLACED IN 2019- DOING WELL

## 2020-07-28 NOTE — PHYSICAL EXAM
[de-identified] : Left knee exam today he is neurovascularly intact distally range of motion is 5-110°. The knee is warm definite medial joint line tenderness soft tissue swelling is noted no effusion. No evidence of instability. Quad tone is good [de-identified] : AP standing individual lateral and sunrise views are obtained show a well-positioned [employer] Journey-type knee on the right. Left knee has complete cartilage loss in the medial aspect with mild varus inclination of  12°.

## 2020-07-28 NOTE — HISTORY OF PRESENT ILLNESS
[de-identified] : Patient is status post October 2019 right knee replacement he is quite pleased with the results. He is here to discuss left knee replacement surgery. He has severe pain on a daily basis has limited ambulatory range. He takes over-the-counter anti-inflammatories with minimal relief.

## 2020-07-28 NOTE — DISCUSSION/SUMMARY
[Surgical risks reviewed] : Surgical risks reviewed [de-identified] : We discussed a regional wrist benefits of knee replacement surgery. Patient has progressed questions were addressed to his satisfaction. We talked about the potential need for revision surgery deep component migration wear loosening in addition to the possibility of infection and stiffness. Patient accepted this and like pneumothoraces possible.

## 2020-08-16 DIAGNOSIS — Z01.818 ENCOUNTER FOR OTHER PREPROCEDURAL EXAMINATION: ICD-10-CM

## 2020-08-17 ENCOUNTER — APPOINTMENT (OUTPATIENT)
Dept: DISASTER EMERGENCY | Facility: CLINIC | Age: 80
End: 2020-08-17

## 2020-08-17 DIAGNOSIS — Z01.818 ENCOUNTER FOR OTHER PREPROCEDURAL EXAMINATION: ICD-10-CM

## 2020-08-18 ENCOUNTER — TRANSCRIPTION ENCOUNTER (OUTPATIENT)
Age: 80
End: 2020-08-18

## 2020-08-18 VITALS
RESPIRATION RATE: 18 BRPM | SYSTOLIC BLOOD PRESSURE: 133 MMHG | OXYGEN SATURATION: 97 % | TEMPERATURE: 97 F | WEIGHT: 197.31 LBS | HEART RATE: 60 BPM | HEIGHT: 67 IN | DIASTOLIC BLOOD PRESSURE: 80 MMHG

## 2020-08-18 LAB — SARS-COV-2 N GENE NPH QL NAA+PROBE: NOT DETECTED

## 2020-08-18 RX ORDER — POVIDONE-IODINE 5 %
1 AEROSOL (ML) TOPICAL ONCE
Refills: 0 | Status: COMPLETED | OUTPATIENT
Start: 2020-08-19 | End: 2020-08-19

## 2020-08-19 ENCOUNTER — APPOINTMENT (OUTPATIENT)
Dept: ORTHOPEDIC SURGERY | Facility: HOSPITAL | Age: 80
End: 2020-08-19
Payer: MEDICARE

## 2020-08-19 ENCOUNTER — INPATIENT (INPATIENT)
Facility: HOSPITAL | Age: 80
LOS: 1 days | Discharge: HOME HEALTH | DRG: 470 | End: 2020-08-21
Attending: SPECIALIST | Admitting: SPECIALIST
Payer: MEDICARE

## 2020-08-19 DIAGNOSIS — E78.5 HYPERLIPIDEMIA, UNSPECIFIED: ICD-10-CM

## 2020-08-19 DIAGNOSIS — N18.9 CHRONIC KIDNEY DISEASE, UNSPECIFIED: ICD-10-CM

## 2020-08-19 DIAGNOSIS — I10 ESSENTIAL (PRIMARY) HYPERTENSION: ICD-10-CM

## 2020-08-19 DIAGNOSIS — Z98.890 OTHER SPECIFIED POSTPROCEDURAL STATES: Chronic | ICD-10-CM

## 2020-08-19 DIAGNOSIS — M19.90 UNSPECIFIED OSTEOARTHRITIS, UNSPECIFIED SITE: ICD-10-CM

## 2020-08-19 DIAGNOSIS — N31.9 NEUROMUSCULAR DYSFUNCTION OF BLADDER, UNSPECIFIED: ICD-10-CM

## 2020-08-19 PROCEDURE — 27447 TOTAL KNEE ARTHROPLASTY: CPT | Mod: LT

## 2020-08-19 PROCEDURE — 73560 X-RAY EXAM OF KNEE 1 OR 2: CPT | Mod: 26,LT

## 2020-08-19 RX ORDER — MAGNESIUM HYDROXIDE 400 MG/1
30 TABLET, CHEWABLE ORAL DAILY
Refills: 0 | Status: DISCONTINUED | OUTPATIENT
Start: 2020-08-19 | End: 2020-08-21

## 2020-08-19 RX ORDER — OXYCODONE HYDROCHLORIDE 5 MG/1
5 TABLET ORAL EVERY 4 HOURS
Refills: 0 | Status: DISCONTINUED | OUTPATIENT
Start: 2020-08-19 | End: 2020-08-21

## 2020-08-19 RX ORDER — ATORVASTATIN CALCIUM 80 MG/1
20 TABLET, FILM COATED ORAL AT BEDTIME
Refills: 0 | Status: DISCONTINUED | OUTPATIENT
Start: 2020-08-19 | End: 2020-08-21

## 2020-08-19 RX ORDER — AMLODIPINE BESYLATE 2.5 MG/1
0 TABLET ORAL
Qty: 0 | Refills: 0 | DISCHARGE

## 2020-08-19 RX ORDER — OXYCODONE HYDROCHLORIDE 5 MG/1
20 TABLET ORAL ONCE
Refills: 0 | Status: DISCONTINUED | OUTPATIENT
Start: 2020-08-19 | End: 2020-08-19

## 2020-08-19 RX ORDER — CHLORHEXIDINE GLUCONATE 213 G/1000ML
1 SOLUTION TOPICAL ONCE
Refills: 0 | Status: COMPLETED | OUTPATIENT
Start: 2020-08-19 | End: 2020-08-19

## 2020-08-19 RX ORDER — OXYCODONE HYDROCHLORIDE 5 MG/1
10 TABLET ORAL EVERY 4 HOURS
Refills: 0 | Status: DISCONTINUED | OUTPATIENT
Start: 2020-08-19 | End: 2020-08-21

## 2020-08-19 RX ORDER — ONDANSETRON 8 MG/1
4 TABLET, FILM COATED ORAL EVERY 6 HOURS
Refills: 0 | Status: DISCONTINUED | OUTPATIENT
Start: 2020-08-19 | End: 2020-08-21

## 2020-08-19 RX ORDER — ACETAMINOPHEN 500 MG
650 TABLET ORAL EVERY 6 HOURS
Refills: 0 | Status: DISCONTINUED | OUTPATIENT
Start: 2020-08-19 | End: 2020-08-21

## 2020-08-19 RX ORDER — ASPIRIN/CALCIUM CARB/MAGNESIUM 324 MG
325 TABLET ORAL DAILY
Refills: 0 | Status: DISCONTINUED | OUTPATIENT
Start: 2020-08-20 | End: 2020-08-21

## 2020-08-19 RX ORDER — ATORVASTATIN CALCIUM 80 MG/1
1 TABLET, FILM COATED ORAL
Qty: 0 | Refills: 0 | DISCHARGE

## 2020-08-19 RX ORDER — SENNA PLUS 8.6 MG/1
2 TABLET ORAL AT BEDTIME
Refills: 0 | Status: DISCONTINUED | OUTPATIENT
Start: 2020-08-19 | End: 2020-08-21

## 2020-08-19 RX ORDER — POLYETHYLENE GLYCOL 3350 17 G/17G
17 POWDER, FOR SOLUTION ORAL DAILY
Refills: 0 | Status: DISCONTINUED | OUTPATIENT
Start: 2020-08-19 | End: 2020-08-21

## 2020-08-19 RX ORDER — CELECOXIB 200 MG/1
200 CAPSULE ORAL DAILY
Refills: 0 | Status: DISCONTINUED | OUTPATIENT
Start: 2020-08-20 | End: 2020-08-21

## 2020-08-19 RX ORDER — HYDROMORPHONE HYDROCHLORIDE 2 MG/ML
0.5 INJECTION INTRAMUSCULAR; INTRAVENOUS; SUBCUTANEOUS
Refills: 0 | Status: DISCONTINUED | OUTPATIENT
Start: 2020-08-19 | End: 2020-08-20

## 2020-08-19 RX ORDER — CEFAZOLIN SODIUM 1 G
2000 VIAL (EA) INJECTION EVERY 8 HOURS
Refills: 0 | Status: COMPLETED | OUTPATIENT
Start: 2020-08-19 | End: 2020-08-20

## 2020-08-19 RX ORDER — SODIUM CHLORIDE 9 MG/ML
1000 INJECTION, SOLUTION INTRAVENOUS
Refills: 0 | Status: DISCONTINUED | OUTPATIENT
Start: 2020-08-19 | End: 2020-08-20

## 2020-08-19 RX ORDER — HYDROMORPHONE HYDROCHLORIDE 2 MG/ML
0.5 INJECTION INTRAMUSCULAR; INTRAVENOUS; SUBCUTANEOUS EVERY 4 HOURS
Refills: 0 | Status: DISCONTINUED | OUTPATIENT
Start: 2020-08-19 | End: 2020-08-21

## 2020-08-19 RX ADMIN — OXYCODONE HYDROCHLORIDE 20 MILLIGRAM(S): 5 TABLET ORAL at 12:58

## 2020-08-19 RX ADMIN — Medication 1 APPLICATION(S): at 10:47

## 2020-08-19 RX ADMIN — ATORVASTATIN CALCIUM 20 MILLIGRAM(S): 80 TABLET, FILM COATED ORAL at 21:10

## 2020-08-19 RX ADMIN — Medication 100 MILLIGRAM(S): at 21:11

## 2020-08-19 RX ADMIN — CHLORHEXIDINE GLUCONATE 1 APPLICATION(S): 213 SOLUTION TOPICAL at 10:57

## 2020-08-19 RX ADMIN — OXYCODONE HYDROCHLORIDE 20 MILLIGRAM(S): 5 TABLET ORAL at 12:57

## 2020-08-19 NOTE — H&P ADULT - NSHPLABSRESULTS_GEN_ALL_CORE
Preop CBC, BMP, PT/INR, PTT WNL  Preop EKG WNL per clearance  COVID swab on 8/17 - negative  3M DOS  UA DOS Preop CBC, PT/INR, PTT WNL  Preop BMP - Cr 1.29 - otherwise WNL  Preop EKG WNL per clearance  COVID swab on 8/17 - negative  3M DOS  UA DOS

## 2020-08-19 NOTE — PHYSICAL THERAPY INITIAL EVALUATION ADULT - CRITERIA FOR SKILLED THERAPEUTIC INTERVENTIONS
therapy frequency/predicted duration of therapy intervention/anticipated discharge recommendation/functional limitations in following categories/risk reduction/prevention/impairments found

## 2020-08-19 NOTE — H&P ADULT - HISTORY OF PRESENT ILLNESS
81 yo M with left knee pain x 81 yo M with left knee pain x years worsened over time without improvement. Failed conservative treatments. Denies numbness, tingling. Ambulates without assist. Pt denies any recent fevers/chills, chest pain, body aches, shortness of breath, sick contacts. Denies history of DVT/PE. Presents today for elective left total knee replacement. He had a right total knee replacement with Dr. Ndiaye in October and had no post-operative complications.

## 2020-08-19 NOTE — PHYSICAL THERAPY INITIAL EVALUATION ADULT - ADDITIONAL COMMENTS
Patient lives with spouse in private house with 6-7 ELTON and 12 steps to second floor bedroom. Does not use any Assistive Devices at baseline. Owns RW and SC from previous surgery. Denies recent Hx of falls.

## 2020-08-19 NOTE — H&P ADULT - NSHPPHYSICALEXAM_GEN_ALL_CORE
NAD, sitting comfortably in chair  Decreased ROM of left knee secondary to pain, EHL/FHL/GS/TA 5/5 BLE, 2+ DP BLE, SILT throughout    Rest of PE per medical clearance.

## 2020-08-19 NOTE — PHYSICAL THERAPY INITIAL EVALUATION ADULT - GENERAL OBSERVATIONS, REHAB EVAL
Patient received semi-barnett in bed  in NAD on RA, +SCDs, +PIV. Cleared by DOMINIC Sy. Agreeable to PT.

## 2020-08-19 NOTE — PHYSICAL THERAPY INITIAL EVALUATION ADULT - PERTINENT HX OF CURRENT PROBLEM, REHAB EVAL
79 yo M with left knee pain x years worsened over time without improvement. Failed conservative treatments. Denies numbness, tingling. Ambulates without assist. Pt denies any recent fevers/chills, chest pain, body aches, shortness of breath, sick contacts. Denies history of DVT/PE. Presents today for elective left total knee replacement. He had a right total knee replacement with Dr. Ndiaye in October and had no post-operative complications.

## 2020-08-19 NOTE — PHYSICAL THERAPY INITIAL EVALUATION ADULT - PLANNED THERAPY INTERVENTIONS, PT EVAL
strengthening/gait training/postural re-education/balance training/bed mobility training/neuromuscular re-education/ROM/transfer training

## 2020-08-19 NOTE — H&P ADULT - NSICDXPASTMEDICALHX_GEN_ALL_CORE_FT
PAST MEDICAL HISTORY:  Epididymitis     HLD (hyperlipidemia)     HTN (hypertension)     Neurogenic bladder pt self cathaterize every 6 hours    Osteoarthritis bilateral knee PAST MEDICAL HISTORY:  Chronic renal insufficiency     Epididymitis     HLD (hyperlipidemia)     HTN (hypertension)     Neurogenic bladder pt self cathaterize every 6 hours    Osteoarthritis bilateral knee

## 2020-08-19 NOTE — PHYSICAL THERAPY INITIAL EVALUATION ADULT - GAIT DEVIATIONS NOTED, PT EVAL
decreased step length/increased time in double stance/decreased velocity of limb motion/decreased stride length/decreased swing-to-stance ratio

## 2020-08-19 NOTE — H&P ADULT - PROBLEM SELECTOR PLAN 1
Admit to Orthopedic service  For elective left total knee replacement  Medically cleared for surgery by Dr. Cunningham

## 2020-08-19 NOTE — PHYSICAL THERAPY INITIAL EVALUATION ADULT - STANDING BALANCE: DYNAMIC, REHAB EVAL
<<-----Click on this checkbox to enter Procedure Hysteroscopy with dilation and curettage of uterus  08/17/2018    Active  DAPHNIEL fair minus

## 2020-08-20 ENCOUNTER — TRANSCRIPTION ENCOUNTER (OUTPATIENT)
Age: 80
End: 2020-08-20

## 2020-08-20 LAB
ANION GAP SERPL CALC-SCNC: 10 MMOL/L — SIGNIFICANT CHANGE UP (ref 5–17)
BUN SERPL-MCNC: 29 MG/DL — HIGH (ref 7–23)
CALCIUM SERPL-MCNC: 9 MG/DL — SIGNIFICANT CHANGE UP (ref 8.4–10.5)
CHLORIDE SERPL-SCNC: 106 MMOL/L — SIGNIFICANT CHANGE UP (ref 96–108)
CO2 SERPL-SCNC: 25 MMOL/L — SIGNIFICANT CHANGE UP (ref 22–31)
CREAT SERPL-MCNC: 1.31 MG/DL — HIGH (ref 0.5–1.3)
GLUCOSE SERPL-MCNC: 156 MG/DL — HIGH (ref 70–99)
HCT VFR BLD CALC: 46.4 % — SIGNIFICANT CHANGE UP (ref 39–50)
HGB BLD-MCNC: 15.1 G/DL — SIGNIFICANT CHANGE UP (ref 13–17)
MCHC RBC-ENTMCNC: 30 PG — SIGNIFICANT CHANGE UP (ref 27–34)
MCHC RBC-ENTMCNC: 32.5 GM/DL — SIGNIFICANT CHANGE UP (ref 32–36)
MCV RBC AUTO: 92.1 FL — SIGNIFICANT CHANGE UP (ref 80–100)
NRBC # BLD: 0 /100 WBCS — SIGNIFICANT CHANGE UP (ref 0–0)
PLATELET # BLD AUTO: 180 K/UL — SIGNIFICANT CHANGE UP (ref 150–400)
POTASSIUM SERPL-MCNC: 5.3 MMOL/L — SIGNIFICANT CHANGE UP (ref 3.5–5.3)
POTASSIUM SERPL-SCNC: 5.3 MMOL/L — SIGNIFICANT CHANGE UP (ref 3.5–5.3)
RBC # BLD: 5.04 M/UL — SIGNIFICANT CHANGE UP (ref 4.2–5.8)
RBC # FLD: 12.8 % — SIGNIFICANT CHANGE UP (ref 10.3–14.5)
SODIUM SERPL-SCNC: 141 MMOL/L — SIGNIFICANT CHANGE UP (ref 135–145)
WBC # BLD: 11.79 K/UL — HIGH (ref 3.8–10.5)
WBC # FLD AUTO: 11.79 K/UL — HIGH (ref 3.8–10.5)

## 2020-08-20 RX ORDER — PANTOPRAZOLE SODIUM 20 MG/1
40 TABLET, DELAYED RELEASE ORAL
Refills: 0 | Status: DISCONTINUED | OUTPATIENT
Start: 2020-08-20 | End: 2020-08-21

## 2020-08-20 RX ADMIN — OXYCODONE HYDROCHLORIDE 10 MILLIGRAM(S): 5 TABLET ORAL at 11:50

## 2020-08-20 RX ADMIN — CELECOXIB 200 MILLIGRAM(S): 200 CAPSULE ORAL at 11:50

## 2020-08-20 RX ADMIN — CELECOXIB 200 MILLIGRAM(S): 200 CAPSULE ORAL at 12:20

## 2020-08-20 RX ADMIN — Medication 100 MILLIGRAM(S): at 04:36

## 2020-08-20 RX ADMIN — ATORVASTATIN CALCIUM 20 MILLIGRAM(S): 80 TABLET, FILM COATED ORAL at 20:33

## 2020-08-20 RX ADMIN — POLYETHYLENE GLYCOL 3350 17 GRAM(S): 17 POWDER, FOR SOLUTION ORAL at 11:50

## 2020-08-20 RX ADMIN — Medication 325 MILLIGRAM(S): at 11:50

## 2020-08-20 RX ADMIN — OXYCODONE HYDROCHLORIDE 10 MILLIGRAM(S): 5 TABLET ORAL at 12:20

## 2020-08-20 NOTE — DISCHARGE NOTE PROVIDER - NSDCMRMEDTOKEN_GEN_ALL_CORE_FT
amLODIPine:   atorvastatin 20 mg oral tablet: 1 tab(s) orally once a day amLODIPine:   aspirin 325 mg oral delayed release tablet: 1 tab(s) orally once a day  atorvastatin 20 mg oral tablet: 1 tab(s) orally once a day  BMP:   oxyCODONE 5 mg oral tablet: 1-2 tab(s) orally every 4-6 hours, As needed, Moderate to severe pain MDD:6  polyethylene glycol 3350 oral powder for reconstitution: 17 gram(s) orally once a day  senna oral tablet: 2 tab(s) orally once a day (at bedtime), As needed, Constipation

## 2020-08-20 NOTE — DISCHARGE NOTE PROVIDER - CARE PROVIDERS DIRECT ADDRESSES
[email or web address]

## 2020-08-20 NOTE — PROGRESS NOTE ADULT - SUBJECTIVE AND OBJECTIVE BOX
S: Patient seen and examined. Pt. doing well, Pain endorsed but controlled. No acute events overnight.    Vital Signs Last 24 Hrs  T(C): 36.4 (20 Aug 2020 04:35), Max: 36.7 (19 Aug 2020 16:05)  T(F): 97.6 (20 Aug 2020 04:35), Max: 98.1 (19 Aug 2020 16:05)  HR: 55 (20 Aug 2020 04:35) (42 - 62)  BP: 137/80 (20 Aug 2020 04:35) (106/66 - 158/87)  BP(mean): 85 (19 Aug 2020 17:35) (82 - 92)  RR: 16 (20 Aug 2020 04:35) (12 - 25)  SpO2: 96% (20 Aug 2020 04:35) (90% - 99%)    NAD, AOx3, comfortable  Motor: 5/5 GS/TA/EHL/FHL    Sensation: SILT   Pulses: WWP, DP/PT 2+    Dressing: distal staining, aquacel changed          A/P:  80y Male s/p L TKA on 8/19          -Stable  -Pain Control  -PT/WBAT  -DVT ppx: ASA  -Advance diet as tolerated  -f/u AM labs  -Dispo: pending PT

## 2020-08-20 NOTE — PROGRESS NOTE ADULT - SUBJECTIVE AND OBJECTIVE BOX
Ortho Note    Pt comfortable without complaints, pain controlled, self-caths for urine prn  Denies CP, SOB, N/V, numbness/tingling     Vital Signs Last 24 Hrs  T(C): 36.9 (08-20-20 @ 09:12), Max: 36.9 (08-20-20 @ 09:12)  T(F): 98.4 (08-20-20 @ 09:12), Max: 98.4 (08-20-20 @ 09:12)  HR: 77 (08-20-20 @ 09:12) (77 - 77)  BP: 151/90 (08-20-20 @ 09:12) (151/90 - 151/90)  BP(mean): --  RR: 16 (08-20-20 @ 09:12) (16 - 16)  SpO2: 96% (08-20-20 @ 09:12) (96% - 96%)    General: Pt Alert and oriented, NAD  DSG Left knee distal aspect saturated, changed and reinforced with kerlix/ACE bandage  Pulses intact LLE  Sensation intact LLE  Motor: EHL/FHL/TA/GS 5/5 LLE                          15.1   11.79 )-----------( 180      ( 20 Aug 2020 06:02 )             46.4   20 Aug 2020 06:02    141    |  106    |  29     ----------------------------<  156    5.3     |  25     |  1.31     Ca    9.0        20 Aug 2020 06:02        A/P: 80yMale POD#1 s/p Left TKR  - Stable  - Pain Control  - DVT ppx: asa  - PT, WBS: wbat    Ortho Pager [phone] Ortho Note    Pt comfortable without complaints, pain controlled, self-caths for urine prn  Denies CP, SOB, N/V, numbness/tingling     Vital Signs Last 24 Hrs  T(C): 36.9 (08-20-20 @ 09:12), Max: 36.9 (08-20-20 @ 09:12)  T(F): 98.4 (08-20-20 @ 09:12), Max: 98.4 (08-20-20 @ 09:12)  HR: 77 (08-20-20 @ 09:12) (77 - 77)  BP: 151/90 (08-20-20 @ 09:12) (151/90 - 151/90)  BP(mean): --  RR: 16 (08-20-20 @ 09:12) (16 - 16)  SpO2: 96% (08-20-20 @ 09:12) (96% - 96%)    General: Pt Alert and oriented, NAD  DSG Left knee distal aspect saturated, changed and reinforced with kerlix/ACE bandage  Pulses intact LLE  Sensation intact LLE  Motor: EHL/FHL/TA/GS 5/5 LLE                          15.1   11.79 )-----------( 180      ( 20 Aug 2020 06:02 )             46.4   20 Aug 2020 06:02    141    |  106    |  29     ----------------------------<  156    5.3     |  25     |  1.31     (outpatient BUN/Cr 27/1.29)    Ca    9.0        20 Aug 2020 06:02        A/P: 80yMale POD#1 s/p Left TKR  - Stable  - Pain Control  - DVT ppx: asa  - PT, WBS: wbat    Ortho Pager [phone]

## 2020-08-20 NOTE — DISCHARGE NOTE PROVIDER - NSDCFUADDINST_GEN_ALL_CORE_FT
Weight bear as tolerated with assistive device.  No strenuous activity, heavy lifting, driving or returning to work until cleared by MD.  You may shower - dressing is water-resistant, no soaking in bathtubs.  Remove dressing after post op day 5-7, then leave incision open to air. Keep incision clean and dry.  Try to have regular bowel movements, take stool softener or laxative if necessary.  Swelling may travel all the way down leg to foot, this is normal and will subside in a few weeks.  Call to schedule an appt with Dr. Ndiaye for follow up, if you have staples or sutures they will be removed in office.  Contact your doctor if you experience: fever greater than 101.5, chills, chest pain, difficulty breathing, redness or excessive drainage around the incision, other concerns.  Follow up with your primary care provider. It is recommended you have repeat labs drawn within 1 week of discharge at outpatient lab testing center to check your kidney function.   Weight bear as tolerated with assistive device.  No strenuous activity, heavy lifting, driving or returning to work until cleared by MD.  You may shower - dressing is water-resistant, no soaking in bathtubs.  Remove dressing after post op day 5-7, then leave incision open to air. Keep incision clean and dry.  Take aspirin 325mg once daily for 10 days.   Try to have regular bowel movements, take stool softener or laxative if necessary.  Swelling may travel all the way down leg to foot, this is normal and will subside in a few weeks.  Call to schedule an appt with Dr. Ndiaye for follow up, if you have staples or sutures they will be removed in office.  Contact your doctor if you experience: fever greater than 101.5, chills, chest pain, difficulty breathing, redness or excessive drainage around the incision, other concerns.  Follow up with your primary care provider.

## 2020-08-20 NOTE — DISCHARGE NOTE PROVIDER - NSDCCPCAREPLAN_GEN_ALL_CORE_FT
PRINCIPAL DISCHARGE DIAGNOSIS  Diagnosis: Osteoarthritis  Assessment and Plan of Treatment: improvement s/p Left TKR

## 2020-08-20 NOTE — DISCHARGE NOTE PROVIDER - CARE PROVIDER_API CALL
Leighton Ndiaye  ORTHOPAEDIC SURGERY  [address] [facility], 10th [place] [address]  Phone: [phone]  Fax: [phone]  Follow Up Time: 2 weeks

## 2020-08-20 NOTE — DISCHARGE NOTE PROVIDER - HOSPITAL COURSE
Admitted 8/19/20    Surgery 8/19/20 Left TKR    Iona-op Antibiotics    Pain control    DVT prophylaxis    OOB/Physical Therapy

## 2020-08-20 NOTE — PROGRESS NOTE ADULT - SUBJECTIVE AND OBJECTIVE BOX
Ortho Post Op Check    Procedure: L TKA  Surgeon: Senthil    Pt comfortable without complaints, pain controlled  Denies CP, SOB, N/V, numbness/tingling     Vital Signs Last 24 Hrs  T(C): 36.3 (08-19-20 @ 19:58), Max: 36.3 (08-19-20 @ 19:58)  T(F): 97.4 (08-19-20 @ 19:58), Max: 97.4 (08-19-20 @ 19:58)  HR: 53 (08-19-20 @ 19:58) (53 - 54)  BP: 129/66 (08-19-20 @ 19:58) (129/66 - 158/87)  BP(mean): --  RR: 16 (08-19-20 @ 19:58) (16 - 16)  SpO2: 96% (08-19-20 @ 19:58) (90% - 96%)  AVSS    General: Pt Alert and oriented, NAD  L knee aquacell DSG C/D/I  Sensation intact s/s/sp/dp/t and symmetric   Motor: EHL/FHL/TA/GS 5/5 and symmetric   2+ DP pulse  Toes WWP      Post-op X-Ray: Hardware in place    A/P: 80yMale POD#0 s/p L TKA 8/19  - Stable  - Pain Control  - DVT ppx: ASA  - Post op abx: ancef periop  - PT, WBS: WBAT  - dispo home pending PT eval     Ortho Pager [phone]

## 2020-08-20 NOTE — DISCHARGE NOTE PROVIDER - NSDCHHNEEDSERVICE_GEN_ALL_CORE
Other, specify.../Rehabilitation services/Medication teaching and assessment/Observation and assessment/Teaching and training/Wound care and assessment

## 2020-08-20 NOTE — DISCHARGE NOTE PROVIDER - NSDCACTIVITY_GEN_ALL_CORE
Stairs allowed/Walking - Outdoors allowed/Showering allowed/No heavy lifting/straining/Walking - Indoors allowed/Do not drive or operate machinery

## 2020-08-21 ENCOUNTER — TRANSCRIPTION ENCOUNTER (OUTPATIENT)
Age: 80
End: 2020-08-21

## 2020-08-21 VITALS
HEART RATE: 88 BPM | RESPIRATION RATE: 18 BRPM | TEMPERATURE: 98 F | OXYGEN SATURATION: 93 % | SYSTOLIC BLOOD PRESSURE: 154 MMHG | DIASTOLIC BLOOD PRESSURE: 75 MMHG

## 2020-08-21 LAB
ANION GAP SERPL CALC-SCNC: 9 MMOL/L — SIGNIFICANT CHANGE UP (ref 5–17)
BUN SERPL-MCNC: 35 MG/DL — HIGH (ref 7–23)
CALCIUM SERPL-MCNC: 8.9 MG/DL — SIGNIFICANT CHANGE UP (ref 8.4–10.5)
CHLORIDE SERPL-SCNC: 104 MMOL/L — SIGNIFICANT CHANGE UP (ref 96–108)
CO2 SERPL-SCNC: 28 MMOL/L — SIGNIFICANT CHANGE UP (ref 22–31)
CREAT SERPL-MCNC: 1.61 MG/DL — HIGH (ref 0.5–1.3)
GLUCOSE SERPL-MCNC: 108 MG/DL — HIGH (ref 70–99)
HCT VFR BLD CALC: 42.3 % — SIGNIFICANT CHANGE UP (ref 39–50)
HGB BLD-MCNC: 13.8 G/DL — SIGNIFICANT CHANGE UP (ref 13–17)
MCHC RBC-ENTMCNC: 29.9 PG — SIGNIFICANT CHANGE UP (ref 27–34)
MCHC RBC-ENTMCNC: 32.6 GM/DL — SIGNIFICANT CHANGE UP (ref 32–36)
MCV RBC AUTO: 91.6 FL — SIGNIFICANT CHANGE UP (ref 80–100)
NRBC # BLD: 0 /100 WBCS — SIGNIFICANT CHANGE UP (ref 0–0)
PLATELET # BLD AUTO: 157 K/UL — SIGNIFICANT CHANGE UP (ref 150–400)
POTASSIUM SERPL-MCNC: 5.3 MMOL/L — SIGNIFICANT CHANGE UP (ref 3.5–5.3)
POTASSIUM SERPL-SCNC: 5.3 MMOL/L — SIGNIFICANT CHANGE UP (ref 3.5–5.3)
RBC # BLD: 4.62 M/UL — SIGNIFICANT CHANGE UP (ref 4.2–5.8)
RBC # FLD: 13.3 % — SIGNIFICANT CHANGE UP (ref 10.3–14.5)
SODIUM SERPL-SCNC: 141 MMOL/L — SIGNIFICANT CHANGE UP (ref 135–145)
WBC # BLD: 11.8 K/UL — HIGH (ref 3.8–10.5)
WBC # FLD AUTO: 11.8 K/UL — HIGH (ref 3.8–10.5)

## 2020-08-21 PROCEDURE — C1776: CPT

## 2020-08-21 PROCEDURE — 99233 SBSQ HOSP IP/OBS HIGH 50: CPT

## 2020-08-21 PROCEDURE — C1713: CPT

## 2020-08-21 PROCEDURE — 80048 BASIC METABOLIC PNL TOTAL CA: CPT

## 2020-08-21 PROCEDURE — 85027 COMPLETE CBC AUTOMATED: CPT

## 2020-08-21 PROCEDURE — 97161 PT EVAL LOW COMPLEX 20 MIN: CPT

## 2020-08-21 PROCEDURE — 97116 GAIT TRAINING THERAPY: CPT

## 2020-08-21 PROCEDURE — 73560 X-RAY EXAM OF KNEE 1 OR 2: CPT

## 2020-08-21 PROCEDURE — 97530 THERAPEUTIC ACTIVITIES: CPT

## 2020-08-21 RX ORDER — SENNA PLUS 8.6 MG/1
2 TABLET ORAL
Qty: 0 | Refills: 0 | DISCHARGE
Start: 2020-08-21

## 2020-08-21 RX ORDER — ASPIRIN/CALCIUM CARB/MAGNESIUM 324 MG
1 TABLET ORAL
Qty: 10 | Refills: 0
Start: 2020-08-21

## 2020-08-21 RX ORDER — POLYETHYLENE GLYCOL 3350 17 G/17G
17 POWDER, FOR SOLUTION ORAL
Qty: 0 | Refills: 0 | DISCHARGE
Start: 2020-08-21

## 2020-08-21 RX ORDER — OXYCODONE HYDROCHLORIDE 5 MG/1
1 TABLET ORAL
Qty: 40 | Refills: 0
Start: 2020-08-21

## 2020-08-21 RX ADMIN — POLYETHYLENE GLYCOL 3350 17 GRAM(S): 17 POWDER, FOR SOLUTION ORAL at 11:56

## 2020-08-21 RX ADMIN — OXYCODONE HYDROCHLORIDE 10 MILLIGRAM(S): 5 TABLET ORAL at 11:03

## 2020-08-21 RX ADMIN — OXYCODONE HYDROCHLORIDE 10 MILLIGRAM(S): 5 TABLET ORAL at 10:34

## 2020-08-21 RX ADMIN — Medication 325 MILLIGRAM(S): at 11:56

## 2020-08-21 NOTE — CONSULT NOTE ADULT - SUBJECTIVE AND OBJECTIVE BOX
80M w h/o CKD, neurogenic bladder w retention requiring intermittent self cath, HLD, HTN here for elective LEFT TKR w Dr. Ndiaye 8/18    today - states pain in knee is controlled. Denies lightheadedness or dizziness when walking w PT. No headache, vision changes, chest pain, dyspnea. Eating wo N/V/Abd pain. No numbness. No dysuria. +Flatus wo BM    ROS: 12 point ROS reviewed and otherwise negative  FH: Denies  SH: No smoking    81 yo M with left knee pain x years worsened over time without improvement. Failed conservative treatments. Denies numbness, tingling. Ambulates without assist. Pt denies any recent fevers/chills, chest pain, body aches, shortness of breath, sick contacts. Denies history of DVT/PE. Presents today for elective left total knee replacement. He had a right total knee replacement with Dr. Ndiaye in October and had no post-operative complications. (19 Aug 2020 09:44)      PAST MEDICAL & SURGICAL HISTORY:  Chronic renal insufficiency  Osteoarthritis: bilateral knee  Neurogenic bladder: pt self cathaterize every 6 hours  Epididymitis  HTN (hypertension)  HLD (hyperlipidemia)  H/O right knee surgery  History of surgery: left ankle    Home Meds: Home Medications:  amLODIPine:  (19 Aug 2020 10:29)  atorvastatin 20 mg oral tablet: 1 tab(s) orally once a day (19 Aug 2020 10:29)  polyethylene glycol 3350 oral powder for reconstitution: 17 gram(s) orally once a day (21 Aug 2020 11:18)  senna oral tablet: 2 tab(s) orally once a day (at bedtime), As needed, Constipation (21 Aug 2020 11:18)    Allergies: Allergies    No Known Allergies    Intolerances      Soc:   Advanced Directives: Presumed Full Code     CURRENT MEDICATIONS:   --------------------------------------------------------------------------------------  Neurologic Medications  acetaminophen   Tablet .. 650 milliGRAM(s) Oral every 6 hours PRN Temp greater or equal to 38C (100.4F), Mild Pain (1 - 3)  HYDROmorphone  Injectable 0.5 milliGRAM(s) IV Push every 4 hours PRN breakthrough pain  ondansetron Injectable 4 milliGRAM(s) IV Push every 6 hours PRN Nausea and/or Vomiting  oxyCODONE    IR 5 milliGRAM(s) Oral every 4 hours PRN Moderate Pain (4 - 6)  oxyCODONE    IR 10 milliGRAM(s) Oral every 4 hours PRN Severe Pain (7 - 10)    Respiratory Medications    Cardiovascular Medications    Gastrointestinal Medications  aluminum hydroxide/magnesium hydroxide/simethicone Suspension 30 milliLiter(s) Oral four times a day PRN Indigestion  bisacodyl Suppository 10 milliGRAM(s) Rectal daily PRN If no bowel movement by postoperative day #2  magnesium hydroxide Suspension 30 milliLiter(s) Oral daily PRN Constipation  pantoprazole    Tablet 40 milliGRAM(s) Oral before breakfast  polyethylene glycol 3350 17 Gram(s) Oral daily  senna 2 Tablet(s) Oral at bedtime PRN Constipation    Genitourinary Medications    Hematologic/Oncologic Medications  aspirin enteric coated 325 milliGRAM(s) Oral daily    Antimicrobial/Immunologic Medications    Endocrine/Metabolic Medications  atorvastatin 20 milliGRAM(s) Oral at bedtime    Topical/Other Medications    --------------------------------------------------------------------------------------    VITAL SIGNS, INS/OUTS (last 24 hours):  --------------------------------------------------------------------------------------  ICU Vital Signs Last 24 Hrs  T(C): 36.7 (21 Aug 2020 08:54), Max: 36.7 (21 Aug 2020 05:07)  T(F): 98.1 (21 Aug 2020 08:54), Max: 98.1 (21 Aug 2020 05:07)  HR: 88 (21 Aug 2020 08:54) (72 - 88)  BP: 154/75 (21 Aug 2020 08:54) (149/78 - 154/75)  BP(mean): --  ABP: --  ABP(mean): --  RR: 18 (21 Aug 2020 08:54) (16 - 18)  SpO2: 93% (21 Aug 2020 08:54) (93% - 97%)    I&O's Summary    20 Aug 2020 07:01  -  21 Aug 2020 07:00  --------------------------------------------------------  IN: 240 mL / OUT: 2150 mL / NET: -1910 mL    21 Aug 2020 07:01  -  22 Aug 2020 01:15  --------------------------------------------------------  IN: 240 mL / OUT: 0 mL / NET: 240 mL      --------------------------------------------------------------------------------------    EXAM:  GEN: male in NAD on RA  HEENT: NC/AT, MMM  CV: RRR, nml S1S2, no BLE edema  PULM: Nml effort, CTAB  ABD: hypoactive bowel sounds, soft, non-tender to palpation  NEURO: Alert, moving all extremities, sensation intact. EOMI  PSYCH: Appropriate, conversant    LABS  --------------------------------------------------------------------------------------  Labs:  CAPILLARY BLOOD GLUCOSE                              13.8   11.80 )-----------( 157      ( 21 Aug 2020 07:06 )             42.3         08-21    141  |  104  |  35<H>  ----------------------------<  108<H>  5.3   |  28  |  1.61<H>      Calcium, Total Serum: 8.9 mg/dL (08-21-20 @ 07:06)      LFTs:         Coags:                  --------------------------------------------------------------------------------------    OTHER LABS    IMAGING RESULTS  ****************

## 2020-08-21 NOTE — DISCHARGE NOTE NURSING/CASE MANAGEMENT/SOCIAL WORK - PATIENT PORTAL LINK FT
You can access the FollowMyHealth Patient Portal offered by [facility] by registering at the following website: [email or web address]. By joining [employer]’s FollowMyHealth portal, you will also be able to view your health information using other applications (apps) compatible with our system.

## 2020-08-21 NOTE — CONSULT NOTE ADULT - ASSESSMENT
80M w h/o CKD, Urinary retention requiring intermittent self cath, HLD, HTN here for elective LEFT TKR w Dr. Ndiaye 8/18    #Post-op state - pain controlled. On bowel regimen and incentive spirometer at bedside. PPx: ASA. DISPO: Home PT  #CKDIII - Cr post-op 1.3 which is at baseline. today at 1.6.   #Neurogenic bladder w Urinary retention - chronic. Pt practices self cath multiple times a day  #HLD - chronic. On home statin  #Obesity - 31 - outpatinet f/u  #HTN - borderline    Plan  Avoid NSAIDs; Encourage PO fluid intake  Recommend obtaining BMP as outpatient - Monday to f/u Cr  Also recommend pt to check BP and resuming amlodipine if BP consistently >140

## 2020-08-21 NOTE — PROGRESS NOTE ADULT - SUBJECTIVE AND OBJECTIVE BOX
S: Patient seen and examined. Pt. doing well, Pain endorsed but controlled. No acute events overnight.    Vital Signs Last 24 Hrs  T(C): 36.7 (21 Aug 2020 05:07), Max: 36.9 (20 Aug 2020 09:12)  T(F): 98.1 (21 Aug 2020 05:07), Max: 98.4 (20 Aug 2020 09:12)  HR: 72 (21 Aug 2020 05:07) (62 - 77)  BP: 149/78 (21 Aug 2020 05:07) (118/72 - 151/90)  BP(mean): --  RR: 16 (21 Aug 2020 05:07) (16 - 17)  SpO2: 97% (21 Aug 2020 05:07) (95% - 97%)    NAD, AOx3, comfortable  Motor: 5/5 GS/TA/EHL/FHL    Sensation: SILT   Pulses: WWP, DP/PT 2+    Dressing: compressive dressing in place, CDI          A/P:  80y Male s/p L TKA on 8/19          -Stable  -Pain Control  -PT/WBAT  -DVT ppx: ASA  -Advance diet as tolerated  -f/u AM labs  -Dispo: pending PT

## 2020-08-21 NOTE — PROGRESS NOTE ADULT - SUBJECTIVE AND OBJECTIVE BOX
Ortho Note    surgery: L TKA 8/19   Patient seen and examined at bedside   Pt comfortable without complaints, pain controlled  Denies CP, SOB, N/V, numbness/tingling     Vital Signs Last 24 Hrs  T(C): 36.7 (08-21-20 @ 08:54), Max: 36.7 (08-21-20 @ 08:54)  T(F): 98.1 (08-21-20 @ 08:54), Max: 98.1 (08-21-20 @ 08:54)  HR: 88 (08-21-20 @ 08:54) (88 - 88)  BP: 154/75 (08-21-20 @ 08:54) (154/75 - 154/75)  BP(mean): --  RR: 18 (08-21-20 @ 08:54) (18 - 18)  SpO2: 93% (08-21-20 @ 08:54) (93% - 93%)  AVSS    General: Pt Alert and oriented, NAD  Dressing C/D/I: left knee ace wrap without evidence of drainage   Pulses: 2+ DP pulse   Sensation: intact to light touch   Motor: EHL/FHL/TA/GS 5/5                          13.8   11.80 )-----------( 157      ( 21 Aug 2020 07:06 )             42.3   21 Aug 2020 07:06    141    |  104    |  35     ----------------------------<  108    5.3     |  28     |  1.61     Ca    8.9        21 Aug 2020 07:06        A/P: 80yMale POD# 2 s/p L TKA      - Cr 1.6, preop 1.29 recommend outpatient BMP draw within 1 week of surgery   - Pain Control: oxycodone PRN Rx for d/c   - DVT ppx: ASA 325mg Daily x10 days   - PT, WBS: WBAT   - dispo: d/c home with [facility]     Ortho Pager [phone]

## 2020-08-26 DIAGNOSIS — N18.3 CHRONIC KIDNEY DISEASE, STAGE 3 (MODERATE): ICD-10-CM

## 2020-08-26 DIAGNOSIS — M17.12 UNILATERAL PRIMARY OSTEOARTHRITIS, LEFT KNEE: ICD-10-CM

## 2020-08-26 DIAGNOSIS — Z96.651 PRESENCE OF RIGHT ARTIFICIAL KNEE JOINT: ICD-10-CM

## 2020-08-26 DIAGNOSIS — R33.8 OTHER RETENTION OF URINE: ICD-10-CM

## 2020-08-26 DIAGNOSIS — I12.9 HYPERTENSIVE CHRONIC KIDNEY DISEASE WITH STAGE 1 THROUGH STAGE 4 CHRONIC KIDNEY DISEASE, OR UNSPECIFIED CHRONIC KIDNEY DISEASE: ICD-10-CM

## 2020-08-26 DIAGNOSIS — N31.9 NEUROMUSCULAR DYSFUNCTION OF BLADDER, UNSPECIFIED: ICD-10-CM

## 2020-08-26 DIAGNOSIS — E78.5 HYPERLIPIDEMIA, UNSPECIFIED: ICD-10-CM

## 2020-08-26 DIAGNOSIS — E66.9 OBESITY, UNSPECIFIED: ICD-10-CM

## 2020-08-26 PROBLEM — M19.90 UNSPECIFIED OSTEOARTHRITIS, UNSPECIFIED SITE: Chronic | Status: ACTIVE | Noted: 2020-08-18

## 2020-08-26 PROBLEM — N18.9 CHRONIC KIDNEY DISEASE, UNSPECIFIED: Chronic | Status: ACTIVE | Noted: 2020-08-19

## 2020-09-01 ENCOUNTER — APPOINTMENT (OUTPATIENT)
Dept: ORTHOPEDIC SURGERY | Facility: CLINIC | Age: 80
End: 2020-09-01
Payer: MEDICARE

## 2020-09-01 PROCEDURE — 99024 POSTOP FOLLOW-UP VISIT: CPT

## 2020-09-01 PROCEDURE — 73562 X-RAY EXAM OF KNEE 3: CPT | Mod: 50

## 2020-09-01 RX ORDER — CEPHALEXIN 500 MG/1
500 CAPSULE ORAL 4 TIMES DAILY
Qty: 20 | Refills: 0 | Status: ACTIVE | COMMUNITY
Start: 2020-09-01 | End: 1900-01-01

## 2020-09-01 NOTE — DISCUSSION/SUMMARY
[de-identified] : Patient had staples removed today Steri-Strips are placed he'll begin outpatient physical therapy prescription also Keflex 500 mg 4 times a day for 5 day course. Followup with me in 4 weeks he will return sooner if there are any issues with the wound. He is also prescribed a nighttime extension splint to help achieve terminal extension.

## 2020-09-01 NOTE — DISCUSSION/SUMMARY
[de-identified] : Patient had staples removed today Steri-Strips are placed he'll begin outpatient physical therapy prescription also Keflex 500 mg 4 times a day for 5 day course. Followup with me in 4 weeks he will return sooner if there are any issues with the wound. He is also prescribed a nighttime extension splint to help achieve terminal extension.

## 2020-09-01 NOTE — REASON FOR VISIT
[Post Operative Visit] : a post operative visit for [FreeTextEntry2] : S/P LEFT TOTAL KNEE 08/19/20 SENT FOR NEW XRAYS

## 2020-09-01 NOTE — HISTORY OF PRESENT ILLNESS
[de-identified] : Patient is status post optic 12 left knee replacement. Require postoperative patient had a small wound dehiscence and had staples placed in the emergency room. He is here without any complaints of pain ambulating without a cane

## 2020-09-01 NOTE — HISTORY OF PRESENT ILLNESS
[de-identified] : Patient is status post optic 12 left knee replacement. Require postoperative patient had a small wound dehiscence and had staples placed in the emergency room. He is here without any complaints of pain ambulating without a cane

## 2020-09-01 NOTE — PHYSICAL EXAM
[de-identified] : Left knee exam today range of motion is °. His neurovascular intact wound is intact there is some erythema around the staples distally. [de-identified] : AP standing individual lateral and sunrise views are obtained showing matched bilateral [employer] journey type knees.

## 2020-09-01 NOTE — PHYSICAL EXAM
[de-identified] : Left knee exam today range of motion is °. His neurovascular intact wound is intact there is some erythema around the staples distally. [de-identified] : AP standing individual lateral and sunrise views are obtained showing matched bilateral [employer] journey type knees.

## 2021-02-16 NOTE — DISCHARGE NOTE PROVIDER - NSDCHHBASESERVICE_GEN_ALL_CORE
Physical therapy/Nursing Detail Level: Simple Note Text (......Xxx Chief Complaint.): This diagnosis correlates with the Render Risk Assessment In Note?: no Other (Free Text): Discussed fillers with patient\\nRecommends seeing our Nurse injector for cosmetic consultation \\nGiven card to schedule when ready

## 2021-06-28 NOTE — PATIENT PROFILE ADULT - LAST TOBACCO USE (DD-MM-YY)
Alert and oriented to person, place and time/Patient baseline mental status/Awake/Symptoms improved 01-Jan-1994

## 2022-03-25 ENCOUNTER — APPOINTMENT (OUTPATIENT)
Dept: UROLOGY | Facility: CLINIC | Age: 82
End: 2022-03-25
Payer: MEDICARE

## 2022-03-25 DIAGNOSIS — R31.0 GROSS HEMATURIA: ICD-10-CM

## 2022-03-25 PROCEDURE — 99213 OFFICE O/P EST LOW 20 MIN: CPT

## 2022-03-25 RX ORDER — CEFUROXIME AXETIL 500 MG/1
500 TABLET ORAL
Qty: 14 | Refills: 0 | Status: ACTIVE | COMMUNITY
Start: 2017-06-02 | End: 1900-01-01

## 2022-03-26 NOTE — ASSESSMENT
[FreeTextEntry1] : likely from  the CIC  - no imaging at this time.\par heading back to [place] in 2 weeks - resent antibiotics in case has issue while travelling - \par culture to tailior selection as needed

## 2022-03-26 NOTE — HISTORY OF PRESENT ILLNESS
[FreeTextEntry1] : Patient  following up for retention.  He has a h/o LUTs and underwent a office-based thermotherapy ~ 4 years ago - he had a Marshall in/out a few times and was then converted to CIC which he has been doing since, 3-4 times a day and once a night. he does have urges which singles the need for a catheter. He had the Marshall replaced in [place] when hospitalized.  A Urodynamics noted high capacity fairly atonic bladder. He does CIC every 4 hours and 0-1 at night. He has noted occasional blood after CIC - NO UTIs this summer..\par Nothing new - about to go to [facility] for the Summer. \par \par 3/22 - haven't seen in 3 years.\par had recent episode when saw some blood in the catheter. cleared up quickly as he took mixture of cefuroxime and bactrim. No fevers, chills or dysuria. \par

## 2022-03-27 LAB
APPEARANCE: ABNORMAL
BACTERIA: ABNORMAL
BILIRUBIN URINE: NEGATIVE
BLOOD URINE: ABNORMAL
COLOR: YELLOW
GLUCOSE QUALITATIVE U: NEGATIVE
HYALINE CASTS: 1 /LPF
KETONES URINE: NEGATIVE
LEUKOCYTE ESTERASE URINE: ABNORMAL
MICROSCOPIC-UA: NORMAL
NITRITE URINE: NEGATIVE
PH URINE: 6
PROTEIN URINE: ABNORMAL
RED BLOOD CELLS URINE: 35 /HPF
SPECIFIC GRAVITY URINE: 1.02
SQUAMOUS EPITHELIAL CELLS: 0 /HPF
UROBILINOGEN URINE: NORMAL
WHITE BLOOD CELLS URINE: >720 /HPF

## 2022-03-29 LAB — BACTERIA UR CULT: ABNORMAL

## 2022-03-29 NOTE — PATIENT PROFILE ADULT - HOW PATIENT ADDRESSED, PROFILE
Action March 29, 2022 5:44 PM ABT   Action Taken Image request sent to [facility]     Action March 30, 2022 12:02 PM ABT   Action Taken Image from [facility] received and resolved to PACS       
Aiden Parra

## 2022-04-08 ENCOUNTER — NON-APPOINTMENT (OUTPATIENT)
Age: 82
End: 2022-04-08

## 2022-04-08 DIAGNOSIS — B99.9 UNSPECIFIED INFECTIOUS DISEASE: ICD-10-CM

## 2022-04-08 RX ORDER — CIPROFLOXACIN HYDROCHLORIDE 500 MG/1
500 TABLET, FILM COATED ORAL
Qty: 14 | Refills: 0 | Status: ACTIVE | COMMUNITY
Start: 2022-04-08 | End: 1900-01-01

## 2022-10-31 ENCOUNTER — EMERGENCY (EMERGENCY)
Facility: HOSPITAL | Age: 82
LOS: 1 days | Discharge: HOME | End: 2022-10-31
Admitting: EMERGENCY MEDICINE
Payer: MEDICARE

## 2022-10-31 VITALS
SYSTOLIC BLOOD PRESSURE: 127 MMHG | HEART RATE: 79 BPM | DIASTOLIC BLOOD PRESSURE: 82 MMHG | RESPIRATION RATE: 16 BRPM | WEIGHT: 195.11 LBS | OXYGEN SATURATION: 96 % | TEMPERATURE: 98 F | HEIGHT: 67 IN

## 2022-10-31 DIAGNOSIS — Z98.890 OTHER SPECIFIED POSTPROCEDURAL STATES: Chronic | ICD-10-CM

## 2022-10-31 PROCEDURE — 99282 EMERGENCY DEPT VISIT SF MDM: CPT

## 2022-10-31 PROCEDURE — 99283 EMERGENCY DEPT VISIT LOW MDM: CPT

## 2022-10-31 NOTE — ED PROVIDER NOTE - CARE PROVIDER_API CALL
Kerrie Richardson)  Otolaryngology  [address] [place] [address], [address]  [place], [place] [address]  Phone: [phone]  Fax: [phone]  Follow Up Time:

## 2022-10-31 NOTE — ED PROVIDER NOTE - OBJECTIVE STATEMENT
83yo m w/ pmh of CKD, neurogenic bladder w retention requiring intermittent self cath, HLD, HTN presents c/o intermittent epistaxis x2 weeks. Pt had 1 episodes of epistaxis lasting approx 15 min this morning. Bleeding stopped w/ pressure and pt applied OTC epistaxis packing afterwards. Pt denies blood thinner use, weakness, lightheadedness, feeling of passing out. 81yo m w/ pmh of CKD, neurogenic bladder w retention requiring intermittent self cath, HLD, HTN presents c/o intermittent epistaxis x2 weeks. Pt had 1 episode of bleeding this morning lasting approx 15 min this morning. Bleeding stopped w/ pressure and pt applied OTC epistaxis packing afterwards. Pt denies blood thinner use, weakness, lightheadedness, feeling of passing out, ha, dizziness, recent trauma.

## 2022-10-31 NOTE — ED PROVIDER NOTE - NSFOLLOWUPINSTRUCTIONS_ED_ALL_ED_FT
Use saline spray to keep membranes moist. If bleeding occurs hold pressure.   Epistaxis    Epistaxis is the medical term for a nosebleed. Nosebleeds are common and can be caused by many conditions, such as injury, infections, dry environments, medicines, nose picking, and home heating and cooling systems. Try controlling your nosebleed by pinching your nose continuously for at least 10 minutes. Avoid lying down while you are having a nosebleed. Sit up and lean forward. Avoid blowing or sniffing your nose for a number of hours after having a nosebleed. Resume your normal activities as you are able, but avoid straining, lifting, or bending at the waist for several days. Maintain humidity in your home by using less air conditioning or by using a humidifier.     If your nose was packed by your health care provider, keep the packing inside of your nose until a health care provider removes it. If a balloon catheter was used to pack your nose, do not cut or remove it unless your health care provider has instructed you to do that.     Aspirin and blood thinners make bleeding more likely. If you are prescribed these medicines and you suffer from nosebleeds, ask your health care provider if you should stop taking the medicines or adjust the dose. Do not stop medicines unless directed by your health care provider.    SEEK IMMEDIATE MEDICAL CARE IF YOU HAVE ANY OF THE FOLLOWING SYMPTOMS: nosebleed lasting longer than 20 minutes, unusual bleeding from or bruising on other parts of your body, dizziness or lightheadedness, fainting, nosebleed occurring after a head injury, or fever.

## 2022-10-31 NOTE — ED ADULT NURSE NOTE - OBJECTIVE STATEMENT
82y male c/o epistaxis x 2 weeks. states his nose started bleeding this morning this morning and he was able to stop it with pressure after 15 minutes. not on blood thinners. not actively bleeding. denies HA, dizziness. No acute distress noted at this time.

## 2022-10-31 NOTE — ED ADULT TRIAGE NOTE - CHIEF COMPLAINT QUOTE
Pt c/o epistaxis from right nares intermittently x2 weeks, with bleeding ~15 minutes this morning. Denies recent falls/injuries. Not on blood thinners. No active bleeding at this time.

## 2022-10-31 NOTE — ED PROVIDER NOTE - PHYSICAL EXAMINATION
CONSTITUTIONAL: Well-appearing;  in no apparent distress.   HEAD: Normocephalic; atraumatic.   EYES: PERRL; EOM intact; conjunctiva and sclera clear  ENT: normal nose; no rhinorrhea; Small pinpoint abrasion on R septum, no active bleeding   NECK: Supple; non-tender;   CARDIOVASCULAR: rrr,

## 2022-10-31 NOTE — ED PROVIDER NOTE - PATIENT PORTAL LINK FT
You can access the FollowMyHealth Patient Portal offered by [facility] by registering at the following website: [email or web address]. By joining [employer]’s FollowMyHealth portal, you will also be able to view your health information using other applications (apps) compatible with our system.

## 2022-10-31 NOTE — ED PROVIDER NOTE - PRINCIPAL DIAGNOSIS
====================following, appreciate recs  -On Entresto, hydralazine, following, appreciate recs  -On Entresto, hydralazine, following, appreciate recs  -On Entresto, hydralazine, following, appreciate recs  -On Entresto, hydralazine, following, appreciate recs  -On Entresto, hydralazine, Epistaxis

## 2022-10-31 NOTE — ED PROVIDER NOTE - CLINICAL SUMMARY MEDICAL DECISION MAKING FREE TEXT BOX
81yo m w/ pmh of CKD, neurogenic bladder w retention requiring intermittent self cath, HLD, HTN presents c/o intermittent epistaxis x2 weeks. Pt had 1 episode of bleeding this morning lasting approx 15 min this morning. Bleeding stopped w/ pressure and pt applied OTC epistaxis packing afterwards. Pt denies blood thinner use, weakness, lightheadedness, feeling of passing out, ha, dizziness, recent trauma. small pinpoint abrasion on R septum, no active bleeding. Henri galvez to ENT. Advised saline spray.

## 2022-11-02 DIAGNOSIS — R04.0 EPISTAXIS: ICD-10-CM

## 2022-11-02 DIAGNOSIS — I12.9 HYPERTENSIVE CHRONIC KIDNEY DISEASE WITH STAGE 1 THROUGH STAGE 4 CHRONIC KIDNEY DISEASE, OR UNSPECIFIED CHRONIC KIDNEY DISEASE: ICD-10-CM

## 2022-11-02 DIAGNOSIS — E78.5 HYPERLIPIDEMIA, UNSPECIFIED: ICD-10-CM
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
## 2022-11-02 DIAGNOSIS — S00.31XA ABRASION OF NOSE, INITIAL ENCOUNTER: ICD-10-CM

## 2022-11-02 DIAGNOSIS — N18.9 CHRONIC KIDNEY DISEASE, UNSPECIFIED: ICD-10-CM
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
## 2022-11-02 DIAGNOSIS — N31.9 NEUROMUSCULAR DYSFUNCTION OF BLADDER, UNSPECIFIED: ICD-10-CM

## 2024-05-29 ENCOUNTER — APPOINTMENT (OUTPATIENT)
Dept: UROLOGY | Facility: CLINIC | Age: 84
End: 2024-05-29
Payer: MEDICARE

## 2024-05-29 VITALS
TEMPERATURE: 97.2 F | BODY MASS INDEX: 33.32 KG/M2 | HEART RATE: 75 BPM | SYSTOLIC BLOOD PRESSURE: 135 MMHG | DIASTOLIC BLOOD PRESSURE: 82 MMHG | WEIGHT: 200 LBS | HEIGHT: 65 IN

## 2024-05-29 DIAGNOSIS — R33.8 OTHER RETENTION OF URINE: ICD-10-CM

## 2024-05-29 DIAGNOSIS — R97.20 ELEVATED PROSTATE, SPECIFIC ANTIGEN [PSA]: ICD-10-CM

## 2024-05-29 PROCEDURE — 99213 OFFICE O/P EST LOW 20 MIN: CPT

## 2024-05-29 RX ORDER — SULFAMETHOXAZOLE AND TRIMETHOPRIM 800; 160 MG/1; MG/1
800-160 TABLET ORAL
Qty: 14 | Refills: 0 | Status: ACTIVE | COMMUNITY
Start: 2018-06-11 | End: 1900-01-01

## 2024-05-30 PROBLEM — R97.20 ELEVATED PSA: Status: ACTIVE | Noted: 2024-05-30

## 2024-05-30 LAB
APPEARANCE: CLEAR
BACTERIA: NEGATIVE /HPF
BILIRUBIN URINE: NEGATIVE
BLOOD URINE: NEGATIVE
CAST: 0 /LPF
COLOR: YELLOW
EPITHELIAL CELLS: 0 /HPF
GLUCOSE QUALITATIVE U: NEGATIVE MG/DL
KETONES URINE: NEGATIVE MG/DL
LEUKOCYTE ESTERASE URINE: ABNORMAL
MICROSCOPIC-UA: NORMAL
NITRITE URINE: NEGATIVE
PH URINE: 6
PROTEIN URINE: 100 MG/DL
RED BLOOD CELLS URINE: 1 /HPF
SPECIFIC GRAVITY URINE: 1.02
UROBILINOGEN URINE: 0.2 MG/DL
WHITE BLOOD CELLS URINE: 116 /HPF

## 2024-05-30 NOTE — HISTORY OF PRESENT ILLNESS
[FreeTextEntry1] : Patient  following up for retention.  He has a h/o LUTs and underwent a office-based thermotherapy ~ 4 years ago - he had a Marshall in/out a few times and was then converted to CIC which he has been doing since, 3-4 times a day and once a night. he does have urges which singles the need for a catheter. He had the Marshall replaced in [place] when hospitalized.  A Urodynamics noted high capacity fairly atonic bladder. He does CIC every 4 hours and 0-1 at night. He has noted occasional blood after CIC - NO UTIs this summer.. Nothing new - about to go to [facility] for the Summer.   3/22 - haven't seen in 3 years. had recent episode when saw some blood in the catheter. cleared up quickly as he took mixture of cefuroxime and bactrim. No fevers, chills or dysuria.   5/29/24 - Here has not seen in 2 years. Does CIC 4-5x - no recent UTI's. Leaving for [facility] 6/25 - back at end of August.  PSA now 7.73.

## 2024-05-30 NOTE — ASSESSMENT
[FreeTextEntry1] : continue CIC - check culture for antibiotics when travels just in case. reassured abiut the PSA given he CIC nd his age: no further evaluation

## 2024-06-03 LAB — BACTERIA UR CULT: ABNORMAL
